# Patient Record
Sex: FEMALE | Race: WHITE | HISPANIC OR LATINO
[De-identification: names, ages, dates, MRNs, and addresses within clinical notes are randomized per-mention and may not be internally consistent; named-entity substitution may affect disease eponyms.]

---

## 2018-05-03 ENCOUNTER — TRANSCRIPTION ENCOUNTER (OUTPATIENT)
Age: 23
End: 2018-05-03

## 2019-01-06 ENCOUNTER — INPATIENT (INPATIENT)
Facility: HOSPITAL | Age: 24
LOS: 3 days | Discharge: HOME | DRG: 153 | End: 2019-01-10
Attending: INTERNAL MEDICINE | Admitting: INTERNAL MEDICINE
Payer: COMMERCIAL

## 2019-01-06 VITALS
DIASTOLIC BLOOD PRESSURE: 61 MMHG | TEMPERATURE: 99 F | HEART RATE: 110 BPM | HEIGHT: 66 IN | OXYGEN SATURATION: 100 % | WEIGHT: 160.06 LBS | RESPIRATION RATE: 17 BRPM | SYSTOLIC BLOOD PRESSURE: 113 MMHG

## 2019-01-06 DIAGNOSIS — M79.10 MYALGIA, UNSPECIFIED SITE: ICD-10-CM

## 2019-01-06 DIAGNOSIS — J02.0 STREPTOCOCCAL PHARYNGITIS: ICD-10-CM

## 2019-01-06 DIAGNOSIS — Z29.9 ENCOUNTER FOR PROPHYLACTIC MEASURES, UNSPECIFIED: ICD-10-CM

## 2019-01-06 DIAGNOSIS — R74.0 NONSPECIFIC ELEVATION OF LEVELS OF TRANSAMINASE AND LACTIC ACID DEHYDROGENASE [LDH]: ICD-10-CM

## 2019-01-06 DIAGNOSIS — D72.825 BANDEMIA: ICD-10-CM

## 2019-01-06 DIAGNOSIS — D72.829 ELEVATED WHITE BLOOD CELL COUNT, UNSPECIFIED: ICD-10-CM

## 2019-01-06 LAB
ALBUMIN SERPL ELPH-MCNC: 3.7 G/DL — SIGNIFICANT CHANGE UP (ref 3.3–5)
ALP SERPL-CCNC: 93 U/L — SIGNIFICANT CHANGE UP (ref 40–120)
ALT FLD-CCNC: 166 U/L — HIGH (ref 10–45)
ANION GAP SERPL CALC-SCNC: 11 MMOL/L — SIGNIFICANT CHANGE UP (ref 5–17)
AST SERPL-CCNC: 151 U/L — HIGH (ref 10–40)
BASOPHILS # BLD AUTO: 0 K/UL — SIGNIFICANT CHANGE UP (ref 0–0.2)
BILIRUB SERPL-MCNC: 0.4 MG/DL — SIGNIFICANT CHANGE UP (ref 0.2–1.2)
BUN SERPL-MCNC: 5 MG/DL — LOW (ref 7–23)
CALCIUM SERPL-MCNC: 8.9 MG/DL — SIGNIFICANT CHANGE UP (ref 8.4–10.5)
CHLORIDE SERPL-SCNC: 104 MMOL/L — SIGNIFICANT CHANGE UP (ref 96–108)
CO2 SERPL-SCNC: 21 MMOL/L — LOW (ref 22–31)
CREAT SERPL-MCNC: 0.79 MG/DL — SIGNIFICANT CHANGE UP (ref 0.5–1.3)
EOSINOPHIL # BLD AUTO: 0.1 K/UL — SIGNIFICANT CHANGE UP (ref 0–0.5)
GLUCOSE SERPL-MCNC: 127 MG/DL — HIGH (ref 70–99)
HCT VFR BLD CALC: 36.4 % — SIGNIFICANT CHANGE UP (ref 34.5–45)
HGB BLD-MCNC: 12.7 G/DL — SIGNIFICANT CHANGE UP (ref 11.5–15.5)
LYMPHOCYTES # BLD AUTO: 1.1 K/UL — SIGNIFICANT CHANGE UP (ref 1–3.3)
LYMPHOCYTES # BLD AUTO: 4 % — LOW (ref 13–44)
MCHC RBC-ENTMCNC: 32.8 PG — SIGNIFICANT CHANGE UP (ref 27–34)
MCHC RBC-ENTMCNC: 34.9 GM/DL — SIGNIFICANT CHANGE UP (ref 32–36)
MCV RBC AUTO: 93.9 FL — SIGNIFICANT CHANGE UP (ref 80–100)
MONOCYTES # BLD AUTO: 1.8 K/UL — HIGH (ref 0–0.9)
MONOCYTES NFR BLD AUTO: 5 % — SIGNIFICANT CHANGE UP (ref 2–14)
NEUTROPHILS # BLD AUTO: 26.9 K/UL — HIGH (ref 1.8–7.4)
NEUTROPHILS NFR BLD AUTO: 79 % — HIGH (ref 43–77)
NEUTS BAND # BLD: 11 % — HIGH (ref 0–8)
PLAT MORPH BLD: NORMAL — SIGNIFICANT CHANGE UP
PLATELET # BLD AUTO: 194 K/UL — SIGNIFICANT CHANGE UP (ref 150–400)
POTASSIUM SERPL-MCNC: 3.8 MMOL/L — SIGNIFICANT CHANGE UP (ref 3.5–5.3)
POTASSIUM SERPL-SCNC: 3.8 MMOL/L — SIGNIFICANT CHANGE UP (ref 3.5–5.3)
PROT SERPL-MCNC: 6.8 G/DL — SIGNIFICANT CHANGE UP (ref 6–8.3)
RAPID RVP RESULT: SIGNIFICANT CHANGE UP
RBC # BLD: 3.88 M/UL — SIGNIFICANT CHANGE UP (ref 3.8–5.2)
RBC # FLD: 11.2 % — SIGNIFICANT CHANGE UP (ref 10.3–14.5)
RBC BLD AUTO: NORMAL — SIGNIFICANT CHANGE UP
S PYO AG SPEC QL IA: POSITIVE
SODIUM SERPL-SCNC: 136 MMOL/L — SIGNIFICANT CHANGE UP (ref 135–145)
VARIANT LYMPHS # BLD: 1 % — SIGNIFICANT CHANGE UP (ref 0–6)
WBC # BLD: 29.9 K/UL — HIGH (ref 3.8–10.5)
WBC # FLD AUTO: 29.9 K/UL — HIGH (ref 3.8–10.5)

## 2019-01-06 PROCEDURE — 99285 EMERGENCY DEPT VISIT HI MDM: CPT

## 2019-01-06 PROCEDURE — 71046 X-RAY EXAM CHEST 2 VIEWS: CPT | Mod: 26

## 2019-01-06 PROCEDURE — 70491 CT SOFT TISSUE NECK W/DYE: CPT | Mod: 26

## 2019-01-06 RX ORDER — SODIUM CHLORIDE 9 MG/ML
1000 INJECTION INTRAMUSCULAR; INTRAVENOUS; SUBCUTANEOUS ONCE
Qty: 0 | Refills: 0 | Status: COMPLETED | OUTPATIENT
Start: 2019-01-06 | End: 2019-01-06

## 2019-01-06 RX ORDER — DEXAMETHASONE 0.5 MG/5ML
10 ELIXIR ORAL ONCE
Qty: 0 | Refills: 0 | Status: COMPLETED | OUTPATIENT
Start: 2019-01-06 | End: 2019-01-06

## 2019-01-06 RX ORDER — ONDANSETRON 8 MG/1
4 TABLET, FILM COATED ORAL ONCE
Qty: 0 | Refills: 0 | Status: COMPLETED | OUTPATIENT
Start: 2019-01-06 | End: 2019-01-06

## 2019-01-06 RX ORDER — ACETAMINOPHEN 500 MG
1000 TABLET ORAL ONCE
Qty: 0 | Refills: 0 | Status: COMPLETED | OUTPATIENT
Start: 2019-01-06 | End: 2019-01-06

## 2019-01-06 RX ORDER — DEXAMETHASONE 0.5 MG/5ML
10 ELIXIR ORAL ONCE
Qty: 0 | Refills: 0 | Status: DISCONTINUED | OUTPATIENT
Start: 2019-01-06 | End: 2019-01-06

## 2019-01-06 RX ORDER — SODIUM CHLORIDE 9 MG/ML
1000 INJECTION, SOLUTION INTRAVENOUS
Qty: 0 | Refills: 0 | Status: DISCONTINUED | OUTPATIENT
Start: 2019-01-06 | End: 2019-01-07

## 2019-01-06 RX ORDER — ENOXAPARIN SODIUM 100 MG/ML
40 INJECTION SUBCUTANEOUS DAILY
Qty: 0 | Refills: 0 | Status: DISCONTINUED | OUTPATIENT
Start: 2019-01-06 | End: 2019-01-10

## 2019-01-06 RX ORDER — KETOROLAC TROMETHAMINE 30 MG/ML
15 SYRINGE (ML) INJECTION ONCE
Qty: 0 | Refills: 0 | Status: DISCONTINUED | OUTPATIENT
Start: 2019-01-06 | End: 2019-01-06

## 2019-01-06 RX ORDER — PANTOPRAZOLE SODIUM 20 MG/1
40 TABLET, DELAYED RELEASE ORAL
Qty: 0 | Refills: 0 | Status: DISCONTINUED | OUTPATIENT
Start: 2019-01-06 | End: 2019-01-10

## 2019-01-06 RX ORDER — AMPICILLIN SODIUM AND SULBACTAM SODIUM 250; 125 MG/ML; MG/ML
3 INJECTION, POWDER, FOR SUSPENSION INTRAMUSCULAR; INTRAVENOUS EVERY 6 HOURS
Qty: 0 | Refills: 0 | Status: DISCONTINUED | OUTPATIENT
Start: 2019-01-06 | End: 2019-01-10

## 2019-01-06 RX ORDER — SODIUM CHLORIDE 9 MG/ML
1000 INJECTION INTRAMUSCULAR; INTRAVENOUS; SUBCUTANEOUS
Qty: 0 | Refills: 0 | Status: DISCONTINUED | OUTPATIENT
Start: 2019-01-06 | End: 2019-01-10

## 2019-01-06 RX ORDER — AMPICILLIN SODIUM AND SULBACTAM SODIUM 250; 125 MG/ML; MG/ML
1.5 INJECTION, POWDER, FOR SUSPENSION INTRAMUSCULAR; INTRAVENOUS ONCE
Qty: 0 | Refills: 0 | Status: COMPLETED | OUTPATIENT
Start: 2019-01-06 | End: 2019-01-06

## 2019-01-06 RX ADMIN — Medication 1000 MILLIGRAM(S): at 13:10

## 2019-01-06 RX ADMIN — SODIUM CHLORIDE 1000 MILLILITER(S): 9 INJECTION INTRAMUSCULAR; INTRAVENOUS; SUBCUTANEOUS at 12:47

## 2019-01-06 RX ADMIN — SODIUM CHLORIDE 100 MILLILITER(S): 9 INJECTION, SOLUTION INTRAVENOUS at 17:42

## 2019-01-06 RX ADMIN — Medication 15 MILLIGRAM(S): at 12:47

## 2019-01-06 RX ADMIN — Medication 10 MILLIGRAM(S): at 13:50

## 2019-01-06 RX ADMIN — SODIUM CHLORIDE 1000 MILLILITER(S): 9 INJECTION INTRAMUSCULAR; INTRAVENOUS; SUBCUTANEOUS at 14:15

## 2019-01-06 RX ADMIN — Medication 15 MILLIGRAM(S): at 13:15

## 2019-01-06 RX ADMIN — ONDANSETRON 4 MILLIGRAM(S): 8 TABLET, FILM COATED ORAL at 12:47

## 2019-01-06 RX ADMIN — AMPICILLIN SODIUM AND SULBACTAM SODIUM 200 GRAM(S): 250; 125 INJECTION, POWDER, FOR SUSPENSION INTRAMUSCULAR; INTRAVENOUS at 22:18

## 2019-01-06 RX ADMIN — AMPICILLIN SODIUM AND SULBACTAM SODIUM 1.5 GRAM(S): 250; 125 INJECTION, POWDER, FOR SUSPENSION INTRAMUSCULAR; INTRAVENOUS at 16:00

## 2019-01-06 RX ADMIN — Medication 102 MILLIGRAM(S): at 13:11

## 2019-01-06 RX ADMIN — AMPICILLIN SODIUM AND SULBACTAM SODIUM 200 GRAM(S): 250; 125 INJECTION, POWDER, FOR SUSPENSION INTRAMUSCULAR; INTRAVENOUS at 14:27

## 2019-01-06 RX ADMIN — Medication 40 MILLIGRAM(S): at 20:36

## 2019-01-06 RX ADMIN — Medication 400 MILLIGRAM(S): at 12:47

## 2019-01-06 NOTE — ED PROVIDER NOTE - DURATION
Problem: Goal Outcome Summary  Goal: Goal Outcome Summary  PT Unit 3: Pt tolerated PT intervention well including positioning in side lying and prone. Pt able to active cervical musculature in prone to assist with rotation and extension. PT will continue to follow 3x/week.  Zoe Toribio, PT, -[phone]       4/day(s)

## 2019-01-06 NOTE — H&P ADULT - NSHPLABSRESULTS_GEN_ALL_CORE
12.7   29.9  )-----------( 194      ( 06 Jan 2019 12:32 )             36.4               01-06    136  |  104  |  5<L>  ----------------------------<  127<H>  3.8   |  21<L>  |  0.79    Ca    8.9      06 Jan 2019 12:32    TPro  6.8  /  Alb  3.7  /  TBili  0.4  /  DBili  x   /  AST  151<H>  /  ALT  166<H>  /  AlkPhos  93  01-06        < from: CT Neck Soft Tissue w/ IV Cont (01.06.19 @ 15:49) >    Impression: Enlargement of the tonsils and uvula consistent with   tonsillitis. No evidence of abscess. Cervical lymphadenopathy.    < end of copied text >

## 2019-01-06 NOTE — ED ADULT NURSE NOTE - OBJECTIVE STATEMENT
24 y/o Female c/o sore throat x4 days, with headache, body aches, RUQ abdominal pain and back pain.   Pt reports difficulty swallowing due to pain and difficulty swallowing secretions. Decreased PO intake.   Denies photophobia, neck stiffness, chest pain, vomiting,  diarrhea, and cough.  No acute respiratory distress noted.  Extremities mobile.

## 2019-01-06 NOTE — H&P ADULT - PROBLEM SELECTOR PLAN 5
secondary to acute infection  monitor and trends   RUQ US patient states that she drink 4-5 shots of hard alcohol daily   not dependant   no Hx of withdrawal   secondary to acute infection  monitor and trends   RUQ US

## 2019-01-06 NOTE — ED PROVIDER NOTE - NS_ ATTENDINGSCRIBEDETAILS _ED_A_ED_FT
The scribe's documentation has been prepared under my direction and personally reviewed by me in its entirety. I confirm that the note above accurately reflects all work, treatment, procedures, and medical decision making performed by me (Dr. Bain).

## 2019-01-06 NOTE — ED PROVIDER NOTE - MUSCULOSKELETAL NECK EXAM
Able to range neck fully and in all planes. Mildly painful submandibular lymphadenopathy. Soft submandibular space.

## 2019-01-06 NOTE — ED PROVIDER NOTE - THROAT FINDINGS
Bilateral tonsillar hypertrophy and exudates. Retropharyngeal space does not appear bulging. No trismus. No pooling of secretions. Muffled voice./NO DROOLING/TONSILLAR SWELLING

## 2019-01-06 NOTE — H&P ADULT - HISTORY OF PRESENT ILLNESS
Pt is a 22 y/o F with no significant PMHx or PSHx c/o sore throat x4 days, myalgia, headache. +RUQ abdominal pain and back pain.  Difficulty swallowing secondary to pain and difficulty swallowing secretions. Decreased PO intake.  Pt has been taking Advil and OTC nasal spray that has not improved symptoms.  Denies photophobia, neck stiffness, vomiting, and cough

## 2019-01-06 NOTE — H&P ADULT - ATTENDING COMMENTS
I had a prolonged conversation with pt. and family re. likely diagnosis and plan who verbalizes clear understanding,   Spoke to all consultants.  75 minutes spent on total encounter; more than 50% of the visit was spent counseling and/or coordinating care by the attending physician.    Differential diagnosis and plan of care discussed with patient after the evaluation.   Advanced care planning options discussed.   Pain assessed and judicious use of narcotics when appropriate was discussed.  Importance of Fall prevention discussed.  Counseling on Smoking and Alcohol cessation was offered when appropriate.  Counseling on Diet, exercise, and medication compliance was done.

## 2019-01-06 NOTE — ED PROVIDER NOTE - OBJECTIVE STATEMENT
24 y/o F with no significant PMHx or PSHx c/o sore throat x4 days, myalgia, headache. +RUQ abdominal pain and back pain.  Difficulty swallowing secondary to pain and difficulty swallowing secretions. Decreased PO intake.  Pt has been taking Advil and OTC nasal spray that has not improved symptoms.  Denies photophobia, neck stiffness, vomiting, and cough.

## 2019-01-06 NOTE — H&P ADULT - ASSESSMENT
patient is a 22 y/o female presenting with severe sore throat and myalgia found to have leukocytosis of 30K

## 2019-01-06 NOTE — ED PROVIDER NOTE - PROGRESS NOTE DETAILS
Manish NAVA: pt with leukocytosis with bandemia and elevated LFTs. Will obtain CT to eval for RPA. Pt continues to look well, reports she feels better with meds. Likely CT and CDU for rpt BW. Pt with leukocytosis with bandemia and elevated LFTs. Will obtain CT to eval for RPA. Pt continues to look well, reports she feels better with meds. Likely CT and CDU for rpt BW and airway monitoring. Manish NAVA: CDU not taking additional pts at this time. case d/w pt's PMD Dr Caba, would like admission to [facility]. [facility]

## 2019-01-06 NOTE — H&P ADULT - NSHPPHYSICALEXAM_GEN_ALL_CORE
Vital Signs Last 24 Hrs  T(C): 37.2 (06 Jan 2019 11:39), Max: 37.2 (06 Jan 2019 11:39)  T(F): 99 (06 Jan 2019 11:39), Max: 99 (06 Jan 2019 11:39)  HR: 100 (06 Jan 2019 12:20) (100 - 110)  BP: 107/59 (06 Jan 2019 12:20) (107/59 - 113/61)  BP(mean): --  RR: 16 (06 Jan 2019 12:20) (16 - 17)  SpO2: 99% (06 Jan 2019 12:20) (99% - 100%)    Appearance: Normal	  HEENT:   enlarged tonsils   Lymphatic: No lymphadenopathy , no edema  Cardiovascular: Normal S1 S2, No JVD, No murmurs , Peripheral pulses palpable 2+ bilaterally  Respiratory: Lungs clear to auscultation, normal effort 	  Gastrointestinal:  Soft, Non-tender, + BS	  Skin: No rashes, No ecchymoses, No cyanosis, warm to touch  Musculoskeletal: Normal range of motion, normal strength  Psychiatry:  Mood & affect appropriate  Ext: No edema

## 2019-01-06 NOTE — ED PROVIDER NOTE - MEDICAL DECISION MAKING DETAILS
Otherwise healthy 24 y/o W with significant tonsillar hypertrophy and pharyngitis. No evidence of airway concern. Associated with malaise and myalgia. Overall non-toxic and well appearing. Tolerating secretions. No indication for imaging at this time. Will treat with fluids and Decadron and anti-inflammatories. Otherwise healthy 24 y/o W with significant tonsillar hypertrophy and pharyngitis. No evidence of airway concern. Associated with malaise and myalgia. Overall non-toxic and well appearing. Tolerating secretions. No indication for imaging at this time. Will treat with fluids and Decadron and anti-inflammatories. Will likely DC home. Otherwise healthy 24 y/o W with significant tonsillar hypertrophy and pharyngitis. No evidence of airway concern. Associated with malaise and myalgia. Overall non-toxic and well appearing. Tolerating secretions. No indication for imaging at this time. Will treat with fluids and Decadron and anti-inflammatories. Will likely DC home barring lab abnormalities.  --BELINDA

## 2019-01-07 DIAGNOSIS — R07.89 OTHER CHEST PAIN: ICD-10-CM

## 2019-01-07 LAB
ALBUMIN SERPL ELPH-MCNC: 3.5 G/DL — SIGNIFICANT CHANGE UP (ref 3.3–5)
ALP SERPL-CCNC: 113 U/L — SIGNIFICANT CHANGE UP (ref 40–120)
ALT FLD-CCNC: 287 U/L — HIGH (ref 10–45)
ANION GAP SERPL CALC-SCNC: 13 MMOL/L — SIGNIFICANT CHANGE UP (ref 5–17)
AST SERPL-CCNC: 239 U/L — HIGH (ref 10–40)
BASOPHILS # BLD AUTO: 0 K/UL — SIGNIFICANT CHANGE UP (ref 0–0.2)
BASOPHILS NFR BLD AUTO: 0 % — SIGNIFICANT CHANGE UP (ref 0–2)
BILIRUB SERPL-MCNC: 0.3 MG/DL — SIGNIFICANT CHANGE UP (ref 0.2–1.2)
BUN SERPL-MCNC: 9 MG/DL — SIGNIFICANT CHANGE UP (ref 7–23)
C TRACH RRNA SPEC QL NAA+PROBE: SIGNIFICANT CHANGE UP
CALCIUM SERPL-MCNC: 9.2 MG/DL — SIGNIFICANT CHANGE UP (ref 8.4–10.5)
CHLORIDE SERPL-SCNC: 107 MMOL/L — SIGNIFICANT CHANGE UP (ref 96–108)
CO2 SERPL-SCNC: 20 MMOL/L — LOW (ref 22–31)
CREAT SERPL-MCNC: 0.69 MG/DL — SIGNIFICANT CHANGE UP (ref 0.5–1.3)
EBV EA AB SER IA-ACNC: 11.5 U/ML — HIGH
EBV EA AB TITR SER IF: POSITIVE
EBV EA IGG SER-ACNC: POSITIVE
EBV NA IGG SER IA-ACNC: >600 U/ML — HIGH
EBV PATRN SPEC IB-IMP: SIGNIFICANT CHANGE UP
EBV VCA IGG AVIDITY SER QL IA: POSITIVE
EBV VCA IGM SER IA-ACNC: 24.8 U/ML — SIGNIFICANT CHANGE UP
EBV VCA IGM SER IA-ACNC: 367 U/ML — HIGH
EBV VCA IGM TITR FLD: NEGATIVE — SIGNIFICANT CHANGE UP
EOSINOPHIL # BLD AUTO: 0 K/UL — SIGNIFICANT CHANGE UP (ref 0–0.5)
EOSINOPHIL NFR BLD AUTO: 0 % — SIGNIFICANT CHANGE UP (ref 0–6)
GLUCOSE SERPL-MCNC: 162 MG/DL — HIGH (ref 70–99)
HBA1C BLD-MCNC: 5 % — SIGNIFICANT CHANGE UP (ref 4–5.6)
HCG UR QL: NEGATIVE — SIGNIFICANT CHANGE UP
HCT VFR BLD CALC: 36.3 % — SIGNIFICANT CHANGE UP (ref 34.5–45)
HGB BLD-MCNC: 12.3 G/DL — SIGNIFICANT CHANGE UP (ref 11.5–15.5)
LYMPHOCYTES # BLD AUTO: 1.76 K/UL — SIGNIFICANT CHANGE UP (ref 1–3.3)
LYMPHOCYTES # BLD AUTO: 5 % — LOW (ref 13–44)
MCHC RBC-ENTMCNC: 31.8 PG — SIGNIFICANT CHANGE UP (ref 27–34)
MCHC RBC-ENTMCNC: 33.9 GM/DL — SIGNIFICANT CHANGE UP (ref 32–36)
MCV RBC AUTO: 93.8 FL — SIGNIFICANT CHANGE UP (ref 80–100)
MONOCYTES # BLD AUTO: 2.11 K/UL — HIGH (ref 0–0.9)
MONOCYTES NFR BLD AUTO: 6 % — SIGNIFICANT CHANGE UP (ref 2–14)
N GONORRHOEA RRNA SPEC QL NAA+PROBE: SIGNIFICANT CHANGE UP
NEUTROPHILS # BLD AUTO: 31.28 K/UL — HIGH (ref 1.8–7.4)
NEUTROPHILS NFR BLD AUTO: 86 % — HIGH (ref 43–77)
PLATELET # BLD AUTO: 255 K/UL — SIGNIFICANT CHANGE UP (ref 150–400)
POTASSIUM SERPL-MCNC: 4 MMOL/L — SIGNIFICANT CHANGE UP (ref 3.5–5.3)
POTASSIUM SERPL-SCNC: 4 MMOL/L — SIGNIFICANT CHANGE UP (ref 3.5–5.3)
PROT SERPL-MCNC: 6.8 G/DL — SIGNIFICANT CHANGE UP (ref 6–8.3)
RBC # BLD: 3.87 M/UL — SIGNIFICANT CHANGE UP (ref 3.8–5.2)
RBC # FLD: 12.7 % — SIGNIFICANT CHANGE UP (ref 10.3–14.5)
SODIUM SERPL-SCNC: 140 MMOL/L — SIGNIFICANT CHANGE UP (ref 135–145)
SPECIMEN SOURCE: SIGNIFICANT CHANGE UP
WBC # BLD: 35.15 K/UL — HIGH (ref 3.8–10.5)
WBC # FLD AUTO: 35.15 K/UL — HIGH (ref 3.8–10.5)

## 2019-01-07 RX ADMIN — PANTOPRAZOLE SODIUM 40 MILLIGRAM(S): 20 TABLET, DELAYED RELEASE ORAL at 06:44

## 2019-01-07 RX ADMIN — Medication 40 MILLIGRAM(S): at 08:23

## 2019-01-07 RX ADMIN — Medication 40 MILLIGRAM(S): at 21:39

## 2019-01-07 RX ADMIN — AMPICILLIN SODIUM AND SULBACTAM SODIUM 200 GRAM(S): 250; 125 INJECTION, POWDER, FOR SUSPENSION INTRAMUSCULAR; INTRAVENOUS at 16:55

## 2019-01-07 RX ADMIN — SODIUM CHLORIDE 100 MILLILITER(S): 9 INJECTION INTRAMUSCULAR; INTRAVENOUS; SUBCUTANEOUS at 03:40

## 2019-01-07 RX ADMIN — AMPICILLIN SODIUM AND SULBACTAM SODIUM 200 GRAM(S): 250; 125 INJECTION, POWDER, FOR SUSPENSION INTRAMUSCULAR; INTRAVENOUS at 21:39

## 2019-01-07 RX ADMIN — SODIUM CHLORIDE 100 MILLILITER(S): 9 INJECTION INTRAMUSCULAR; INTRAVENOUS; SUBCUTANEOUS at 16:55

## 2019-01-07 RX ADMIN — AMPICILLIN SODIUM AND SULBACTAM SODIUM 200 GRAM(S): 250; 125 INJECTION, POWDER, FOR SUSPENSION INTRAMUSCULAR; INTRAVENOUS at 03:39

## 2019-01-07 RX ADMIN — AMPICILLIN SODIUM AND SULBACTAM SODIUM 200 GRAM(S): 250; 125 INJECTION, POWDER, FOR SUSPENSION INTRAMUSCULAR; INTRAVENOUS at 10:26

## 2019-01-07 NOTE — CONSULT NOTE ADULT - SUBJECTIVE AND OBJECTIVE BOX
CHIEF COMPLAINT:Patient is a 23y old  Female who presents with a chief complaint of tonsilitis, leukocytosis (07 Jan 2019 16:55)      HISTORY OF PRESENT ILLNESS:HPI:  Pt is a 22 y/o F with no significant PMHx or PSHx c/o sore throat x4 days, myalgia, headache. +RUQ abdominal pain and back pain.  Difficulty swallowing secondary to pain and difficulty swallowing secretions. Decreased PO intake.  Pt has been taking Advil and OTC nasal spray that has not improved symptoms.  Denies photophobia, neck stiffness, vomiting, and cough (06 Jan 2019 17:47)      PAST MEDICAL & SURGICAL HISTORY:  No pertinent past medical history  No significant past surgical history          MEDICATIONS:  enoxaparin Injectable 40 milliGRAM(s) SubCutaneous daily    ampicillin/sulbactam  IVPB 3 Gram(s) IV Intermittent every 6 hours        pantoprazole    Tablet 40 milliGRAM(s) Oral before breakfast    methylPREDNISolone sodium succinate Injectable 40 milliGRAM(s) IV Push every 12 hours    sodium chloride 0.9%. 1000 milliLiter(s) IV Continuous <Continuous>      FAMILY HISTORY:      Non-contributory    SOCIAL HISTORY:    not a smoker    Allergies    No Known Allergies    Intolerances     Review of Systems:  Review of Systems: REVIEW OF SYSTEMS:   CONSTITUTIONAL: + weakness, + fevers or chills  EYES/ENT: + sore throat, + weakness, ead ache   NECK: No pain or stiffness  RESPIRATORY: No cough, wheezing, hemoptysis; No shortness of breath  CARDIOVASCULAR: No chest pain or palpitations  GASTROINTESTINAL: No abdominal or epigastric pain. No nausea, vomiting, or hematemesis; No diarrhea or constipation. No melena or hematochezia.  GENITOURINARY: No dysuria, frequency or hematuria  NEUROLOGICAL: No numbness or weakness  SKIN: No itching, burning, rashes, or lesions  All other review of systems is negative unless indicated above.	  	    All other ROS negative    PHYSICAL EXAM:  T(C): 36.6 (01-07-19 @ 19:56), Max: 36.8 (01-07-19 @ 04:50)  HR: 54 (01-07-19 @ 19:56) (54 - 90)  BP: 96/53 (01-07-19 @ 19:56) (93/50 - 102/69)  RR: 18 (01-07-19 @ 19:56) (18 - 18)  SpO2: 95% (01-07-19 @ 19:56) (94% - 99%)  Wt(kg): --  I&O's Summary      Appearance: Normal	  HEENT:   Normal oral mucosa, EOMI	  Cardiovascular: Normal S1 S2, No JVD, No murmurs  Respiratory: Lungs clear to auscultation	  Psychiatry: Alert, Mood & affect appropriate  Gastrointestinal:  Soft, Non-tender, + BS	  Skin: No rashes, No ecchymoses, No cyanosis	  Neurologic: Non-focal  Extremities:  No clubbing, cyanosis or edema  Vascular: Peripheral pulses palpable  bilaterally  	    	  	  CARDIAC MARKERS:                                  12.3   35.15 )-----------( 255      ( 07 Jan 2019 08:20 )             36.3     01-07    140  |  107  |  9   ----------------------------<  162<H>  4.0   |  20<L>  |  0.69    Ca    9.2      07 Jan 2019 06:16    TPro  6.8  /  Alb  3.5  /  TBili  0.3  /  DBili  x   /  AST  239<H>  /  ALT  287<H>  /  AlkPhos  113  01-07        Radiology: cxr clear lungs      Assessment and Plan:   · Assessment		  patient is a 22 y/o female presenting with severe sore throat and myalgia found to have leukocytosis of 30K     Problem/Plan - 1:  ·  Problem: Pharyngitis due to Streptococcus species.  Plan: ID eval noted   ABx per ID  IV hydration.     Problem/Plan - 2:  ·  Problem: Leukocytosis.  Plan: monitor and trend  worsening this morning most likley secondary to steroid use   Hematology eval appreciated.     Problem/Plan - 3:  ·  Problem: Chest wall pain.  Plan: tender on palpation , does not appear cardiac in nature  EKG  Echo as outpt     Problem/Plan - 4:  ·  Problem: Myalgia.  Plan: 2/2 infectious process    Problem/Plan - 5:  ·  Problem: Bandemia.  Plan: monitor.     Problem/Plan - 6:  ·  Problem: Prophylactic measure.  Plan: DVT and Gi PPX.         Nilesh Juarez DO [facility]  [phone]

## 2019-01-07 NOTE — CONSULT NOTE ADULT - SUBJECTIVE AND OBJECTIVE BOX
Patient is a 23y old  Female who presents with a chief complaint of sore throat, body aches (07 Jan 2019 14:54)    HPI:  Pt is a 22 y/o female with no significant PMHx, PSHx, or daily medications who developed progressive sore throat, myalgias, RUQ abdominal pain, and rigors since Friday. She used nasal spray and advil to no effect. No tylenol. Febrile to 102.6 at home. She came to the ED on Sunday am when her sore throat precluded her from speaking or swallowing. Ct neck revealed tonsilitis but no abscess. Found to have marked leukocytosis and transaminitis that has worsened today. EBV serology consistent with past infection. + strep A.   She denies anorexia, nausea, emesis, diarrhea, constipation, blood in the stool. Much improved today symptomatically on unasyn and steroids.     PAST MEDICAL & SURGICAL HISTORY:  No pertinent past medical history  No significant past surgical history    Allergies  No Known Allergies    MEDICATIONS  (STANDING):  ampicillin/sulbactam  IVPB 3 Gram(s) IV Intermittent every 6 hours  enoxaparin Injectable 40 milliGRAM(s) SubCutaneous daily  methylPREDNISolone sodium succinate Injectable 40 milliGRAM(s) IV Push every 12 hours  pantoprazole    Tablet 40 milliGRAM(s) Oral before breakfast  sodium chloride 0.9%. 1000 milliLiter(s) (100 mL/Hr) IV Continuous <Continuous>    Social History: lives alone  + vaping, + alcohol abuse    Family History: noncontribultory    Review of Systems:  General: see  HPI  Cardiac: Denies chest pain, shortness of breath, dyspnea on exertion, syncope, lower extremity edema  Respiratory: Denies cough, dyspnea, wheezing  Gastrointestinal: See HPI. Denies dysphagia, odynophagia, rectal bleeding, melena  Genitourinary: Denies dysuria, hematuria, frequency  Dermatologic: Denies jaundice, pruritis, rashes  Hematologic: Denies abnormal bleeding, bruising, petechaie   Musculoskeletal: see HPI  Rheumatologic: Denies joint pain, swelling, stiffness  Neurologic: Denies headache, paresthesias, changes in vision or speech    Vital Signs Last 24 Hrs  T(C): 36.8 (07 Jan 2019 08:25), Max: 37.1 (06 Jan 2019 16:11)  T(F): 98.2 (07 Jan 2019 08:25), Max: 98.7 (06 Jan 2019 16:11)  HR: 90 (07 Jan 2019 08:25) (57 - 93)  BP: 102/69 (07 Jan 2019 08:25) (99/56 - 103/70)  BP(mean): --  RR: 18 (07 Jan 2019 08:25) (16 - 18)  SpO2: 99% (07 Jan 2019 08:25) (96% - 100%)    PHYSICAL EXAM:  Constitutional: Alert and oriented x 3, in no acute distress  HEENT: NCAT, no scleral icterus,   NECK: tonsilar enlargement  Cardiovascular: RRR, S1 and S2, no audible murmurs  Respiratory: Clear to ascultation bilateraly, no wheezes, rales, rhonchi  Gastrointestinal: soft, NTND, normactive bowel sounds, no palpable HSM   Extremities: No peripheral edema, clubbing, or cyanosis  Psychiatric: Normal mood, normal affect  Skin: No rashes, jaundice, ecchymosis    LABS:                        12.3   35.15 )-----------( 255      ( 07 Jan 2019 08:20 )             36.3     01-07    140  |  107  |  9   ----------------------------<  162<H>  4.0   |  20<L>  |  0.69    Ca    9.2      07 Jan 2019 06:16    TPro  6.8  /  Alb  3.5  /  TBili  0.3  /  DBili  x   /  AST  239<H>  /  ALT  287<H>  /  AlkPhos  113  01-07      LIVER FUNCTIONS - ( 07 Jan 2019 06:16 )  Alb: 3.5 g/dL / Pro: 6.8 g/dL / ALK PHOS: 113 U/L / ALT: 287 U/L / AST: 239 U/L / GGT: x           RADIOLOGY & ADDITIONAL TESTS:  < from: CT Neck Soft Tissue w/ IV Cont (01.06.19 @ 15:49) >  mpression: Enlargement of the tonsils and uvula consistent with   tonsillitis. No evidence of abscess. Cervical lymphadenopathy.    < end of copied text >

## 2019-01-07 NOTE — PROGRESS NOTE ADULT - ASSESSMENT
patient is a 24 y/o female presenting with severe sore throat and myalgia found to have leukocytosis of 30K

## 2019-01-07 NOTE — CONSULT NOTE ADULT - ASSESSMENT
22 yo female with an acute illness characterized by rigors, myalgias, and a pharyngitis with swallowing difficulties.  This could be an acute group A strep tonsillitis although her elevated LFT's are higher than I would expect as is her wbc count.  Acute EBV is in the differential but serology seems to point towards old infection.  Adult onset JRA can produce a similar clinical picture, often associated with high fever and thrombocytosis.  Suggest:  1.Unasyn with eventual conversion to augmentin  2.Follow LFT's  3.Favor ENT evaluation  4.Serial CBC,Diff and CMP  5.Hep A,B,C serologies  6.CMV IgG,IgM  7.Steroid taper, would favor in hospital evaluation for another 24 hours as she is at risk for worsening airway compromise when steroids are tapered. 24 yo female with an acute illness characterized by rigors, myalgias, and a pharyngitis with swallowing difficulties.  This could be an acute group A strep tonsillitis although her elevated LFT's are higher than I would expect as is her wbc count.  Acute EBV is in the differential but serology seems to point towards old infection.  Adult onset JRA can produce a similar clinical picture, often associated with high fever and thrombocytosis.  Suggest:  1.Unasyn with eventual conversion to augmentin  2.Follow LFT's  3.Favor ENT evaluation  4.Serial CBC,Diff and CMP  5.Hep A,B,C serologies  6.CMV IgG,IgM, Ferritin level  7.Steroid taper, would favor in hospital evaluation for another 24 hours as she is at risk for worsening airway compromise when steroids are tapered.

## 2019-01-07 NOTE — CONSULT NOTE ADULT - SUBJECTIVE AND OBJECTIVE BOX
HPI:   Patient is a 23y female with no significant PMH who came to ER 1/6 with 24 hours of rigors and throat pain with difficulty swallowing.She was given IV steroids and Unasyn and is doing better.No exposure to children or ill contacts.Her LFT's and WBC are elevated,her EBV seerology is c/w old infection and GAS screen is positive.She is doing better today, her CT scan showed tonsillar enlargement and cervical adenopathy.No recent infections, no joint pains or rashes.Her last sexual contact was 1 months ago.    REVIEW OF SYSTEMS:  All other review of systems negative (Comprehensive ROS)    PAST MEDICAL & SURGICAL HISTORY:  No pertinent past medical history  No significant past surgical history      Allergies    No Known Allergies    Intolerances        Antimicrobials Day #  :day 2  ampicillin/sulbactam  IVPB 3 Gram(s) IV Intermittent every 6 hours    Other Medications:  enoxaparin Injectable 40 milliGRAM(s) SubCutaneous daily  methylPREDNISolone sodium succinate Injectable 40 milliGRAM(s) IV Push every 12 hours  pantoprazole    Tablet 40 milliGRAM(s) Oral before breakfast  sodium chloride 0.9%. 1000 milliLiter(s) IV Continuous <Continuous>      FAMILY HISTORY:      SOCIAL HISTORY:  Smoking:  no   ETOH: no   Drug Use:no     Single     T(F): 98.2 (01-07-19 @ 08:25), Max: 98.7 (01-06-19 @ 16:11)  HR: 90 (01-07-19 @ 08:25)  BP: 102/69 (01-07-19 @ 08:25)  RR: 18 (01-07-19 @ 08:25)  SpO2: 99% (01-07-19 @ 08:25)  Wt(kg): --    PHYSICAL EXAM:  General: alert, no acute distress  Eyes:  anicteric, no conjunctival injection, no discharge  Oropharynx: enlarged, "kissing"tonsills, white exudate 	  Neck: supple, without adenopathy  Lungs: clear to auscultation  Heart: regular rate and rhythm; no murmur, rubs or gallops  Abdomen: soft, nondistended, nontender, without mass or organomegaly  Skin: no lesions  Extremities: no clubbing, cyanosis, or edema  Neurologic: alert, oriented, moves all extremities    LAB RESULTS:                        12.3   35.15 )-----------( 255      ( 07 Jan 2019 08:20 )             36.3     01-07    140  |  107  |  9   ----------------------------<  162<H>  4.0   |  20<L>  |  0.69    Ca    9.2      07 Jan 2019 06:16    TPro  6.8  /  Alb  3.5  /  TBili  0.3  /  DBili  x   /  AST  239<H>  /  ALT  287<H>  /  AlkPhos  113  01-07    LIVER FUNCTIONS - ( 07 Jan 2019 06:16 )  Alb: 3.5 g/dL / Pro: 6.8 g/dL / ALK PHOS: 113 U/L / ALT: 287 U/L / AST: 239 U/L / GGT: x               MICROBIOLOGY:  RECENT CULTURES:    RVP negative  GAS screen positive    RADIOLOGY REVIEWED:  < from: CT Neck Soft Tissue w/ IV Cont (01.06.19 @ 15:49) >  Impression: Enlargement of the tonsils and uvula consistent with   tonsillitis. No evidence of abscess. Cervical lymphadenopathy.    < end of copied text >  < from: Xray Chest 2 Views PA/Lat (01.06.19 @ 18:53) >  IMPRESSION: Clear lungs.    < end of copied text >

## 2019-01-07 NOTE — CONSULT NOTE ADULT - SUBJECTIVE AND OBJECTIVE BOX
Chief Complaint:  Patient is a 23y old  Female who presents with a chief complaint of tonsilitis, leukocytosis (06 Jan 2019 17:47)      HPI:  patient was okay up until about two weeks ago.  She says that she had a URI.  It initially got better but then she developed body aches, fevers, and swollen tonsils that prevented her from swallowing.  She came to the ED.  She has a significant leucocytosis.  She did have a CT of the neck and she has tonsillitis and cervical adenopathy.  She is being treated with Unasyn and is feeling a little better today.  her only sick contact is a friend who had bronchitis recently    Medications:  ampicillin/sulbactam  IVPB 3 Gram(s) IV Intermittent every 6 hours  enoxaparin Injectable 40 milliGRAM(s) SubCutaneous daily  methylPREDNISolone sodium succinate Injectable 40 milliGRAM(s) IV Push every 12 hours  pantoprazole    Tablet 40 milliGRAM(s) Oral before breakfast  sodium chloride 0.9%. 1000 milliLiter(s) IV Continuous <Continuous>    Allergies:  No Known Allergies    FAMILY HISTORY:  Non contributory    PAST MEDICAL & SURGICAL HISTORY:  No pertinent past medical history  No significant past surgical history    REVIEW OF SYSTEMS      General: fevers resolved.  No chills or sweats.  Some fatigue.  No weight loss.  Appetite is okay    Skin/Breast: No rash, itching or bruising  	  Ophthalmologic: No vision changes  	  ENMT:	No hearing loss, nosebleeds.  She has a sore throat and ear aches which are slowly improving    Respiratory and Thorax: has a dry cough.  No SOB, wheezing or hemoptysis  	  Cardiovascular:	No CP or palpitations    Gastrointestinal:	No heartburn, N/V/D, BRBPR.  She has mild RUQ pain    Genitourinary:	No dysuria, hematuria, frequency    Musculoskeletal:	 No back pain, leg pain, swelling    Neurological: Mild HA.  No dizziness, numbness, tingling    Psychiatric: Mild insomnia and anxiety.  No depression    Vitals:  Vital Signs Last 24 Hrs  T(C): 36.8 (07 Jan 2019 08:25), Max: 37.1 (06 Jan 2019 16:11)  T(F): 98.2 (07 Jan 2019 08:25), Max: 98.7 (06 Jan 2019 16:11)  HR: 90 (07 Jan 2019 08:25) (57 - 93)  BP: 102/69 (07 Jan 2019 08:25) (99/56 - 103/70)  BP(mean): --  RR: 18 (07 Jan 2019 08:25) (16 - 18)  SpO2: 99% (07 Jan 2019 08:25) (96% - 100%)    Pex:  alert NAD  EOMI anicteric sclera  Neck Supple small cervical LNA  Cv s1 S2 RRR  Lungs clear B/L  abd soft ND +BS, mild RUQ tebnderness  No LE edema or tenderness  AoX3 good motor strength    Labs:                        12.3   35.15 )-----------( 255      ( 07 Jan 2019 08:20 )             36.3     CBC Full  -  ( 07 Jan 2019 08:20 )  WBC Count : 35.15 K/uL  Hemoglobin : 12.3 g/dL  Hematocrit : 36.3 %  Platelet Count - Automated : 255 K/uL  Mean Cell Volume : 93.8 fl  Mean Cell Hemoglobin : 31.8 pg  Mean Cell Hemoglobin Concentration : 33.9 gm/dL  Auto Neutrophil # : 31.28 K/uL  Auto Lymphocyte # : 1.76 K/uL  Auto Monocyte # : 2.11 K/uL  Auto Eosinophil # : 0.00 K/uL  Auto Basophil # : 0.00 K/uL  Auto Neutrophil % : 86.0 %  Auto Lymphocyte % : 5.0 %  Auto Monocyte % : 6.0 %  Auto Eosinophil % : 0.0 %  Auto Basophil % : 0.0 %    01-07    140  |  107  |  9   ----------------------------<  162<H>  4.0   |  20<L>  |  0.69    Ca    9.2      07 Jan 2019 06:16    TPro  6.8  /  Alb  3.5  /  TBili  0.3  /  DBili  x   /  AST  239<H>  /  ALT  287<H>  /  AlkPhos  113  01-07      [id number]

## 2019-01-07 NOTE — PROGRESS NOTE ADULT - SUBJECTIVE AND OBJECTIVE BOX
Subjective: Patient seen and examined. No new events except as noted.   cont to have sore throat with pain with swallowing     REVIEW OF SYSTEMS:    CONSTITUTIONAL: No weakness, fevers or chills  EYES/ENT: throat pain   NECK: No pain or stiffness  RESPIRATORY: No cough, wheezing, hemoptysis; No shortness of breath  CARDIOVASCULAR: No chest pain or palpitations  GASTROINTESTINAL: No abdominal or epigastric pain. No nausea, vomiting, or hematemesis; No diarrhea or constipation. No melena or hematochezia.  GENITOURINARY: No dysuria, frequency or hematuria  NEUROLOGICAL: No numbness or weakness  SKIN: No itching, burning, rashes, or lesions   All other review of systems is negative unless indicated above.    MEDICATIONS:  MEDICATIONS  (STANDING):  ampicillin/sulbactam  IVPB 3 Gram(s) IV Intermittent every 6 hours  enoxaparin Injectable 40 milliGRAM(s) SubCutaneous daily  methylPREDNISolone sodium succinate Injectable 40 milliGRAM(s) IV Push every 12 hours  pantoprazole    Tablet 40 milliGRAM(s) Oral before breakfast  sodium chloride 0.9%. 1000 milliLiter(s) (100 mL/Hr) IV Continuous <Continuous>      PHYSICAL EXAM:  T(C): 36.8 (01-07-19 @ 16:07), Max: 37 (01-06-19 @ 21:07)  HR: 86 (01-07-19 @ 16:07) (57 - 90)  BP: 93/50 (01-07-19 @ 16:07) (93/50 - 103/70)  RR: 18 (01-07-19 @ 16:07) (16 - 18)  SpO2: 94% (01-07-19 @ 16:07) (94% - 100%)  Wt(kg): --  I&O's Summary        Appearance: Normal	  HEENT:   Normal oral mucosa, white exudate tonsilitis   Lymphatic: No lymphadenopathy , no edema  Cardiovascular: Normal S1 S2, No JVD, No murmurs , Peripheral pulses palpable 2+ bilaterally  Respiratory: Lungs clear to auscultation, normal effort 	  Gastrointestinal:  Soft, Non-tender, + BS	  Skin: No rashes, No ecchymoses, No cyanosis, warm to touch  Musculoskeletal: Normal range of motion, normal strength  Psychiatry:  Mood & affect appropriate  Ext: No edema      All labs, Imaging and EKGs personally reviewed                           12.3   35.15 )-----------( 255      ( 07 Jan 2019 08:20 )             36.3               01-07    140  |  107  |  9   ----------------------------<  162<H>  4.0   |  20<L>  |  0.69    Ca    9.2      07 Jan 2019 06:16    TPro  6.8  /  Alb  3.5  /  TBili  0.3  /  DBili  x   /  AST  239<H>  /  ALT  287<H>  /  AlkPhos  113  01-07

## 2019-01-07 NOTE — CONSULT NOTE ADULT - ASSESSMENT
23 year old female with no significant PMHx other than daily alcohol consumption for the past 2 months who developed acute sore throat, myalgias, rigors, fever, and abdominal pain. Workup positive for strep A with ct neck demonstrating tonsilitis.   Suspect etiology of transaminitis is related to acute infectious process. No recent tylenol abuse or new medications. EBV serology consistent with past infection. Presentation not consistent with alcoholic hepatitis.  -agree to check abdominal ultrasound, acute hepatitis panel, and CMV serology  -trend LFTs daily  -Will Follow  Above discussed with Dr. Rodrigues 23 year old female with no significant PMHx other than daily alcohol consumption for the past 2 months who developed acute sore throat, myalgias, rigors, fever, and abdominal pain. Workup positive for strep A with ct neck demonstrating tonsilitis.   Suspect etiology of transaminitis is related to acute infectious process. No recent tylenol abuse or new medications. EBV serology consistent with past infection. Presentation not consistent with alcoholic hepatitis.  -agree to check abdominal ultrasound, acute hepatitis panel, and CMV serology  -trend LFTs daily  -follow up bcx  -Will Follow  Above discussed with Dr. Rodrigues

## 2019-01-07 NOTE — CONSULT NOTE ADULT - ASSESSMENT
Patient admitted with fevers, tonsil swelling, body aches.  She has a significant leucocytosis, tonsillitis and cervical adenopathy.  She is on iv antibiotics and is feeling a little better.  She will be seen by ID to decide if she can go home with oral antibiotics or if she needs to stay here for IV antibiotics.  From hem/onc perspective suspect that this is all reactive and will assess for resolution once the infection has been treated appropriately.  She does have elevated liver enzymes which is also likely due to the infection and she will be having an abdominal ultrasound.  If she does go home then she was given the office information to f/u in 1-2 weeks to assess for resolution of above abnormalities.  She does understand what was discussed and agrees with the plan.

## 2019-01-08 LAB
ALBUMIN SERPL ELPH-MCNC: 3.2 G/DL — LOW (ref 3.3–5)
ALP SERPL-CCNC: 144 U/L — HIGH (ref 40–120)
ALT FLD-CCNC: 743 U/L — HIGH (ref 10–45)
APAP SERPL-MCNC: <15 UG/ML — SIGNIFICANT CHANGE UP (ref 10–30)
APPEARANCE UR: CLEAR — SIGNIFICANT CHANGE UP
AST SERPL-CCNC: 674 U/L — HIGH (ref 10–40)
BASOPHILS # BLD AUTO: 0 K/UL — SIGNIFICANT CHANGE UP (ref 0–0.2)
BASOPHILS NFR BLD AUTO: 0 % — SIGNIFICANT CHANGE UP (ref 0–2)
BILIRUB DIRECT SERPL-MCNC: <0.1 MG/DL — SIGNIFICANT CHANGE UP (ref 0–0.2)
BILIRUB INDIRECT FLD-MCNC: >0.1 MG/DL — LOW (ref 0.2–1)
BILIRUB SERPL-MCNC: 0.2 MG/DL — SIGNIFICANT CHANGE UP (ref 0.2–1.2)
BILIRUB UR-MCNC: NEGATIVE — SIGNIFICANT CHANGE UP
CMV IGG FLD QL: <0.2 U/ML — SIGNIFICANT CHANGE UP
CMV IGG SERPL-IMP: NEGATIVE — SIGNIFICANT CHANGE UP
CMV IGM FLD-ACNC: <8 AU/ML — SIGNIFICANT CHANGE UP
CMV IGM SERPL QL: NEGATIVE — SIGNIFICANT CHANGE UP
COLOR SPEC: YELLOW — SIGNIFICANT CHANGE UP
DIFF PNL FLD: NEGATIVE — SIGNIFICANT CHANGE UP
EOSINOPHIL # BLD AUTO: 0.1 K/UL — SIGNIFICANT CHANGE UP (ref 0–0.5)
EOSINOPHIL NFR BLD AUTO: 0.3 % — SIGNIFICANT CHANGE UP (ref 0–6)
GLUCOSE UR QL: NEGATIVE MG/DL — SIGNIFICANT CHANGE UP
HAV IGM SER-ACNC: SIGNIFICANT CHANGE UP
HBV CORE IGM SER-ACNC: SIGNIFICANT CHANGE UP
HBV SURFACE AG SER-ACNC: SIGNIFICANT CHANGE UP
HCT VFR BLD CALC: 36.8 % — SIGNIFICANT CHANGE UP (ref 34.5–45)
HCV AB S/CO SERPL IA: 0.13 S/CO — SIGNIFICANT CHANGE UP
HCV AB SERPL-IMP: SIGNIFICANT CHANGE UP
HGB BLD-MCNC: 12.8 G/DL — SIGNIFICANT CHANGE UP (ref 11.5–15.5)
IRON SATN MFR SERPL: 167 UG/DL — HIGH (ref 30–160)
IRON SATN MFR SERPL: SIGNIFICANT CHANGE UP (ref 14–50)
KETONES UR-MCNC: NEGATIVE — SIGNIFICANT CHANGE UP
LEUKOCYTE ESTERASE UR-ACNC: NEGATIVE — SIGNIFICANT CHANGE UP
LYMPHOCYTES # BLD AUTO: 1.9 K/UL — SIGNIFICANT CHANGE UP (ref 1–3.3)
LYMPHOCYTES # BLD AUTO: 7.5 % — LOW (ref 13–44)
MCHC RBC-ENTMCNC: 33.3 PG — SIGNIFICANT CHANGE UP (ref 27–34)
MCHC RBC-ENTMCNC: 34.8 GM/DL — SIGNIFICANT CHANGE UP (ref 32–36)
MCV RBC AUTO: 95.6 FL — SIGNIFICANT CHANGE UP (ref 80–100)
MONOCYTES # BLD AUTO: 0.9 K/UL — SIGNIFICANT CHANGE UP (ref 0–0.9)
MONOCYTES NFR BLD AUTO: 3.5 % — SIGNIFICANT CHANGE UP (ref 2–14)
NEUTROPHILS # BLD AUTO: 22.7 K/UL — HIGH (ref 1.8–7.4)
NEUTROPHILS NFR BLD AUTO: 88.7 % — HIGH (ref 43–77)
NITRITE UR-MCNC: NEGATIVE — SIGNIFICANT CHANGE UP
PH UR: 6 — SIGNIFICANT CHANGE UP (ref 5–8)
PLATELET # BLD AUTO: 279 K/UL — SIGNIFICANT CHANGE UP (ref 150–400)
PROT SERPL-MCNC: 5.9 G/DL — LOW (ref 6–8.3)
PROT UR-MCNC: ABNORMAL MG/DL
RBC # BLD: 3.85 M/UL — SIGNIFICANT CHANGE UP (ref 3.8–5.2)
RBC # FLD: 11.7 % — SIGNIFICANT CHANGE UP (ref 10.3–14.5)
SP GR SPEC: 1.03 — HIGH (ref 1.01–1.02)
TIBC SERPL-MCNC: SIGNIFICANT CHANGE UP (ref 220–430)
UIBC SERPL-MCNC: <20 UG/DL — LOW (ref 110–370)
UROBILINOGEN FLD QL: 1 MG/DL — SIGNIFICANT CHANGE UP
WBC # BLD: 25.6 K/UL — HIGH (ref 3.8–10.5)
WBC # FLD AUTO: 25.6 K/UL — HIGH (ref 3.8–10.5)

## 2019-01-08 PROCEDURE — 76705 ECHO EXAM OF ABDOMEN: CPT | Mod: 26,RT

## 2019-01-08 PROCEDURE — 78226 HEPATOBILIARY SYSTEM IMAGING: CPT | Mod: 26

## 2019-01-08 PROCEDURE — 93010 ELECTROCARDIOGRAM REPORT: CPT

## 2019-01-08 RX ADMIN — AMPICILLIN SODIUM AND SULBACTAM SODIUM 200 GRAM(S): 250; 125 INJECTION, POWDER, FOR SUSPENSION INTRAMUSCULAR; INTRAVENOUS at 05:19

## 2019-01-08 RX ADMIN — AMPICILLIN SODIUM AND SULBACTAM SODIUM 200 GRAM(S): 250; 125 INJECTION, POWDER, FOR SUSPENSION INTRAMUSCULAR; INTRAVENOUS at 17:37

## 2019-01-08 RX ADMIN — AMPICILLIN SODIUM AND SULBACTAM SODIUM 200 GRAM(S): 250; 125 INJECTION, POWDER, FOR SUSPENSION INTRAMUSCULAR; INTRAVENOUS at 12:44

## 2019-01-08 RX ADMIN — SODIUM CHLORIDE 100 MILLILITER(S): 9 INJECTION INTRAMUSCULAR; INTRAVENOUS; SUBCUTANEOUS at 12:47

## 2019-01-08 RX ADMIN — SODIUM CHLORIDE 100 MILLILITER(S): 9 INJECTION INTRAMUSCULAR; INTRAVENOUS; SUBCUTANEOUS at 17:40

## 2019-01-08 RX ADMIN — Medication 40 MILLIGRAM(S): at 08:48

## 2019-01-08 RX ADMIN — Medication 40 MILLIGRAM(S): at 22:07

## 2019-01-08 RX ADMIN — SODIUM CHLORIDE 100 MILLILITER(S): 9 INJECTION INTRAMUSCULAR; INTRAVENOUS; SUBCUTANEOUS at 08:49

## 2019-01-08 RX ADMIN — PANTOPRAZOLE SODIUM 40 MILLIGRAM(S): 20 TABLET, DELAYED RELEASE ORAL at 05:20

## 2019-01-08 RX ADMIN — AMPICILLIN SODIUM AND SULBACTAM SODIUM 200 GRAM(S): 250; 125 INJECTION, POWDER, FOR SUSPENSION INTRAMUSCULAR; INTRAVENOUS at 22:09

## 2019-01-08 NOTE — PROGRESS NOTE ADULT - SUBJECTIVE AND OBJECTIVE BOX
CC: f/u for  tonsillitis  Patient reports  throat hurts  REVIEW OF SYSTEMS:  All other review of systems negative (Comprehensive ROS)    Antimicrobials Day #  :2  ampicillin/sulbactam  IVPB 3 Gram(s) IV Intermittent every 6 hours    Other Medications Reviewed    T(F): 98 (01-08-19 @ 10:41), Max: 98.3 (01-07-19 @ 16:07)  HR: 83 (01-08-19 @ 10:41)  BP: 101/66 (01-08-19 @ 10:41)  RR: 18 (01-08-19 @ 10:41)  SpO2: 95% (01-08-19 @ 10:41)  Wt(kg): --    PHYSICAL EXAM:  General: alert, no acute distress  Eyes:  anicteric, no conjunctival injection, no discharge  Oropharynx: exudative pharyngitis with large tonsils symmetric	  Neck: supple, without adenopathy  Lungs: clear to auscultation  Heart: regular rate and rhythm; no murmur, rubs or gallops  Abdomen: soft, nondistended, nontender, without mass or organomegaly  Skin: no lesions  Extremities: no clubbing, cyanosis, or edema  Neurologic: alert, oriented, moves all extremities    LAB RESULTS:                        12.3   35.15 )-----------( 255      ( 07 Jan 2019 08:20 )             36.3     01-07    140  |  107  |  9   ----------------------------<  162<H>  4.0   |  20<L>  |  0.69    Ca    9.2      07 Jan 2019 06:16    TPro  5.9<L>  /  Alb  3.2<L>  /  TBili  0.2  /  DBili  <0.1  /  AST  674<H>  /  ALT  743<H>  /  AlkPhos  144<H>  01-08    LIVER FUNCTIONS - ( 08 Jan 2019 07:50 )  Alb: 3.2 g/dL / Pro: 5.9 g/dL / ALK PHOS: 144 U/L / ALT: 743 U/L / AST: 674 U/L / GGT: x             MICROBIOLOGY:  RECENT CULTURES:  01-06 @ 22:15 .Blood Blood-Peripheral     No growth to date.          RADIOLOGY REVIEWED:    < from: US Abdomen Upper Quadrant Right (01.08.19 @ 11:45) >  IMPRESSION:     Mild hepatomegaly.    Diffuse gallbladder wall thickening and edema. Differential diagnosis   includes acalculous cholecystitis as well as other etiologies such as   hepatitis.     Trace pericholecystic fluid. Small right pleural effusion.      < end of copied text >    < from: CT Neck Soft Tissue w/ IV Cont (01.06.19 @ 15:49) >  Impression: Enlargement of the tonsils and uvula consistent with   tonsillitis. No evidence of abscess. Cervical lymphadenopathy.    < end of copied text >    Assessment:  Patient with exudative tonsillapharyngitis with group A strept. Hepatitis may be from sepsis, recent viral uri  with toxins she has taken in- tylenol and alcohol or from viral hepatitis. Await gi f/u but she does not behave like cholecystitis to me  Plan:  continue unasyn  follow up cultures  follow up hepatitis panel and cmv panel and pcr  check tylenol level since she took a lot of theraflu prior to admission  gi f/u CC: f/u for  tonsillitis  Patient reports  throat hurts  REVIEW OF SYSTEMS:  All other review of systems negative (Comprehensive ROS)    Antimicrobials Day #  :2  ampicillin/sulbactam  IVPB 3 Gram(s) IV Intermittent every 6 hours    Other Medications Reviewed    T(F): 98 (01-08-19 @ 10:41), Max: 98.3 (01-07-19 @ 16:07)  HR: 83 (01-08-19 @ 10:41)  BP: 101/66 (01-08-19 @ 10:41)  RR: 18 (01-08-19 @ 10:41)  SpO2: 95% (01-08-19 @ 10:41)  Wt(kg): --    PHYSICAL EXAM:  General: alert, no acute distress  Eyes:  anicteric, no conjunctival injection, no discharge  Oropharynx: exudative pharyngitis with large tonsils symmetric	  Neck: supple, without adenopathy  Lungs: clear to auscultation  Heart: regular rate and rhythm; no murmur, rubs or gallops  Abdomen: soft, nondistended, nontender, without mass or organomegaly  Skin: no lesions  Extremities: no clubbing, cyanosis, or edema  Neurologic: alert, oriented, moves all extremities    LAB RESULTS:                        12.3   35.15 )-----------( 255      ( 07 Jan 2019 08:20 )             36.3     01-07    140  |  107  |  9   ----------------------------<  162<H>  4.0   |  20<L>  |  0.69    Ca    9.2      07 Jan 2019 06:16    TPro  5.9<L>  /  Alb  3.2<L>  /  TBili  0.2  /  DBili  <0.1  /  AST  674<H>  /  ALT  743<H>  /  AlkPhos  144<H>  01-08    LIVER FUNCTIONS - ( 08 Jan 2019 07:50 )  Alb: 3.2 g/dL / Pro: 5.9 g/dL / ALK PHOS: 144 U/L / ALT: 743 U/L / AST: 674 U/L / GGT: x             MICROBIOLOGY:  RECENT CULTURES:  01-06 @ 22:15 .Blood Blood-Peripheral     No growth to date.          RADIOLOGY REVIEWED:    < from: US Abdomen Upper Quadrant Right (01.08.19 @ 11:45) >  IMPRESSION:     Mild hepatomegaly.    Diffuse gallbladder wall thickening and edema. Differential diagnosis   includes acalculous cholecystitis as well as other etiologies such as   hepatitis.     Trace pericholecystic fluid. Small right pleural effusion.      < end of copied text >    < from: CT Neck Soft Tissue w/ IV Cont (01.06.19 @ 15:49) >  Impression: Enlargement of the tonsils and uvula consistent with   tonsillitis. No evidence of abscess. Cervical lymphadenopathy.    < end of copied text >    Assessment:  Patient with exudative tonsillapharyngitis with group A strept. Hepatitis may be from sepsis, recent viral uri  with toxins she has taken in- tylenol and alcohol or from viral hepatitis. Await gi f/u but she does not behave like cholecystitis to me  Plan:  continue unasyn  follow up cultures  follow up hepatitis panel and cmv panel and pcr  check tylenol level since she took a lot of theraflu prior to admission  gi f/u  favor ent eval

## 2019-01-08 NOTE — PROVIDER CONTACT NOTE (OTHER) - ASSESSMENT
Pt denies pain or discomfort upon urination. States she has never had a UTI before. Described urine as bessy in color.

## 2019-01-08 NOTE — PROGRESS NOTE ADULT - SUBJECTIVE AND OBJECTIVE BOX
SUBJECTIVE: Swallowing improved. Remains afebrile. Admits to fleeting RUQ pains overnight and now with very mild dull RUQ discomfort. Tolerating PO (just finished lunch) without worsening abdominal pain, nausea, emesis.    MEDICATIONS  (STANDING):  ampicillin/sulbactam  IVPB 3 Gram(s) IV Intermittent every 6 hours  enoxaparin Injectable 40 milliGRAM(s) SubCutaneous daily  methylPREDNISolone sodium succinate Injectable 40 milliGRAM(s) IV Push every 12 hours  pantoprazole    Tablet 40 milliGRAM(s) Oral before breakfast  sodium chloride 0.9%. 1000 milliLiter(s) (100 mL/Hr) IV Continuous <Continuous>    OBJECTIVE:  Vital Signs Last 24 Hrs  T(C): 36.7 (08 Jan 2019 10:41), Max: 36.8 (07 Jan 2019 16:07)  T(F): 98 (08 Jan 2019 10:41), Max: 98.3 (07 Jan 2019 16:07)  HR: 83 (08 Jan 2019 10:41) (52 - 86)  BP: 101/66 (08 Jan 2019 10:41) (93/50 - 101/66)  BP(mean): --  RR: 18 (08 Jan 2019 10:41) (18 - 18)  SpO2: 95% (08 Jan 2019 10:41) (94% - 97%)    PHYSICAL EXAM:  Constitutional: A&OX3, in NAD,   HEENT: NCAT, no scleral icterus, no palpable LAD  Cardiovascular: RRR, S1 and S2, no audible murmurs  Respiratory: CTAB   Gastrointestinal: soft, focally tender to deep palpation in the RUQ, no rebound or guarding, nondistended, normoactive bowel sounds, no palpable HSM  Extremities: No peripheral edema b/l     LABS:                        12.3   35.15 )-----------( 255      ( 07 Jan 2019 08:20 )             36.3     01-07    140  |  107  |  9   ----------------------------<  162<H>  4.0   |  20<L>  |  0.69    Ca    9.2      07 Jan 2019 06:16    TPro  5.9<L>  /  Alb  3.2<L>  /  TBili  0.2  /  DBili  <0.1  /  AST  674<H>  /  ALT  743<H>  /  AlkPhos  144<H>  01-08      LIVER FUNCTIONS - ( 08 Jan 2019 07:50 )  Alb: 3.2 g/dL / Pro: 5.9 g/dL / ALK PHOS: 144 U/L / ALT: 743 U/L / AST: 674 U/L / GGT: x           RADIOLOGY & ADDITIONAL TESTS:  < from: US Abdomen Upper Quadrant Right (01.08.19 @ 11:45) >  IMPRESSION:     Mild hepatomegaly.    Diffuse gallbladder wall thickening and edema. Differential diagnosis   includes acalculous cholecystitis as well as other etiologies such as   hepatitis.     Trace pericholecystic fluid. Small right pleural effusion.      < end of copied text >

## 2019-01-08 NOTE — PROGRESS NOTE ADULT - SUBJECTIVE AND OBJECTIVE BOX
Subjective: Patient seen and examined. No new events except as noted.   feeling better, tolerating diet     REVIEW OF SYSTEMS:    CONSTITUTIONAL: No weakness, fevers or chills  EYES/ENT: No visual changes;  No vertigo or throat pain   NECK: No pain or stiffness  RESPIRATORY: No cough, wheezing, hemoptysis; No shortness of breath  CARDIOVASCULAR: No chest pain or palpitations  GASTROINTESTINAL: No abdominal or epigastric pain. No nausea, vomiting, or hematemesis; No diarrhea or constipation. No melena or hematochezia.  GENITOURINARY: No dysuria, frequency or hematuria  NEUROLOGICAL: No numbness or weakness  SKIN: No itching, burning, rashes, or lesions   All other review of systems is negative unless indicated above.    MEDICATIONS:  MEDICATIONS  (STANDING):  ampicillin/sulbactam  IVPB 3 Gram(s) IV Intermittent every 6 hours  enoxaparin Injectable 40 milliGRAM(s) SubCutaneous daily  methylPREDNISolone sodium succinate Injectable 40 milliGRAM(s) IV Push every 12 hours  pantoprazole    Tablet 40 milliGRAM(s) Oral before breakfast  sodium chloride 0.9%. 1000 milliLiter(s) (100 mL/Hr) IV Continuous <Continuous>      PHYSICAL EXAM:  T(C): 36.7 (19 @ 10:41), Max: 36.8 (19 @ 16:07)  HR: 83 (19 @ 10:41) (52 - 86)  BP: 101/66 (19 @ 10:41) (93/50 - 101/66)  RR: 18 (19 @ 10:41) (18 - 18)  SpO2: 95% (19 @ 10:41) (94% - 97%)  Wt(kg): --  I&O's Summary    2019 07:  -  2019 07:00  --------------------------------------------------------  IN: 1580 mL / OUT: 0 mL / NET: 1580 mL    2019 07:  -  2019 16:01  --------------------------------------------------------  IN: 800 mL / OUT: 0 mL / NET: 800 mL          Appearance: Normal	  HEENT:   improved tonsilar swelling   Lymphatic: No lymphadenopathy , no edema  Cardiovascular: Normal S1 S2, No JVD, No murmurs , Peripheral pulses palpable 2+ bilaterally  Respiratory: Lungs clear to auscultation, normal effort 	  Gastrointestinal:  Soft, RUQ tenderness on palpation  Skin: No rashes, No ecchymoses, No cyanosis, warm to touch  Musculoskeletal: Normal range of motion, normal strength  Psychiatry:  Mood & affect appropriate  Ext: No edema      All labs, Imaging and EKGs personally reviewed                           12.8   25.6  )-----------( 279      ( 2019 14:35 )             36.8               -07    140  |  107  |  9   ----------------------------<  162<H>  4.0   |  20<L>  |  0.69    Ca    9.2      2019 06:16    TPro  5.9<L>  /  Alb  3.2<L>  /  TBili  0.2  /  DBili  <0.1  /  AST  674<H>  /  ALT  743<H>  /  AlkPhos  144<H>                         Urinalysis Basic - ( 2019 13:26 )    Color: Yellow / Appearance: Clear / S.031 / pH: x  Gluc: x / Ketone: Negative  / Bili: Negative / Urobili: 1.0 mg/dL   Blood: x / Protein: Trace mg/dL / Nitrite: Negative   Leuk Esterase: Negative / RBC: x / WBC x   Sq Epi: x / Non Sq Epi: x / Bacteria: x    < from: US Abdomen Upper Quadrant Right (19 @ 11:45) >  IMPRESSION:     Mild hepatomegaly.    Diffuse gallbladder wall thickening and edema. Differential diagnosis   includes acalculous cholecystitis as well as other etiologies such as   hepatitis.     Trace pericholecystic fluid. Small right pleural effusion.    < end of copied text >

## 2019-01-08 NOTE — PROGRESS NOTE ADULT - ASSESSMENT
1) Leukocytosis- likely reactive. Patient admitted with fevers, tonsil swelling, body aches.  She has a significant  tonsillitis and cervical adenopathy.  She is on iv antibiotics and is feeling a  better. She is also on steroids.  -  From hem/onc perspective suspect that this is all reactive and will assess for resolution once the infection has been treated appropriately.    2) GI- Elevated liver enzymes which is also likely due to the infection . + constipation.  GI and ID following 1) Leukocytosis- likely reactive. Patient admitted with fevers, tonsil swelling, body aches.  She has a significant  tonsillitis and cervical adenopathy.  She is on iv antibiotics and is feeling a  better. She is also on steroids.  -  From hem/onc perspective suspect that this is all reactive and will assess for resolution once the infection has been treated appropriately.    2) GI- Elevated liver enzymes which is also likely due to the infection . + constipation.  GI and ID following  -an abdominal sonogram has been ordered

## 2019-01-08 NOTE — PROGRESS NOTE ADULT - SUBJECTIVE AND OBJECTIVE BOX
JUDITH NASH  MRN-[id number]    Patient is a 23y old  Female who presents with a chief complaint of tonsilitis, leukocytosis (07 Jan 2019 23:47)      Review of System    Feels a bit better, though still with sore throat and cough.  Also reports some constipation, last bm was 3 days ago, mildly bloated    Current Meds  MEDICATIONS  (STANDING):  ampicillin/sulbactam  IVPB 3 Gram(s) IV Intermittent every 6 hours  enoxaparin Injectable 40 milliGRAM(s) SubCutaneous daily  methylPREDNISolone sodium succinate Injectable 40 milliGRAM(s) IV Push every 12 hours  pantoprazole    Tablet 40 milliGRAM(s) Oral before breakfast  sodium chloride 0.9%. 1000 milliLiter(s) (100 mL/Hr) IV Continuous <Continuous>    MEDICATIONS  (PRN):      Vitals  Vital Signs Last 24 Hrs  T(C): 36.6 (07 Jan 2019 19:56), Max: 36.8 (07 Jan 2019 08:25)  T(F): 97.9 (07 Jan 2019 19:56), Max: 98.3 (07 Jan 2019 16:07)  HR: 54 (07 Jan 2019 19:56) (54 - 90)  BP: 96/53 (07 Jan 2019 19:56) (93/50 - 102/69)  BP(mean): --  RR: 18 (07 Jan 2019 19:56) (18 - 18)  SpO2: 95% (07 Jan 2019 19:56) (94% - 99%)    Physical Exam     NAD    Lab  CBC Full  -  ( 07 Jan 2019 08:20 )  WBC Count : 35.15 K/uL  Hemoglobin : 12.3 g/dL  Hematocrit : 36.3 %  Platelet Count - Automated : 255 K/uL  Mean Cell Volume : 93.8 fl  Mean Cell Hemoglobin : 31.8 pg  Mean Cell Hemoglobin Concentration : 33.9 gm/dL  Auto Neutrophil # : 31.28 K/uL  Auto Lymphocyte # : 1.76 K/uL  Auto Monocyte # : 2.11 K/uL  Auto Eosinophil # : 0.00 K/uL  Auto Basophil # : 0.00 K/uL  Auto Neutrophil % : 86.0 %  Auto Lymphocyte % : 5.0 %  Auto Monocyte % : 6.0 %  Auto Eosinophil % : 0.0 %  Auto Basophil % : 0.0 %    01-07    140  |  107  |  9   ----------------------------<  162<H>  4.0   |  20<L>  |  0.69    Ca    9.2      07 Jan 2019 06:16    TPro  6.8  /  Alb  3.5  /  TBili  0.3  /  DBili  x   /  AST  239<H>  /  ALT  287<H>  /  AlkPhos  113  01-07        Rad:    Assessment/Plan

## 2019-01-08 NOTE — PROGRESS NOTE ADULT - ASSESSMENT
23 year old female with no significant PMHx other than daily alcohol consumption for the past 2 months who developed acute sore throat, myalgias, rigors, fever, and abdominal pain. Workup positive for strep A with ct neck demonstrating tonsilitis. Clinically improving with IV antibiotics and steroids. However, her transaminitis is significantly worse today and she is developing more persistent, though mild, RUQ abdominal pain. Ultrasound concerning for acalculous cholecystitis.   -HIDA scan ordered for this pm. Keep NPO as of now  -surgical consultation  -await serum ferritin, acute hepatitis panel, CMV serology  -trend CBC. Continue antibiotics.  Above discussed with Dr. Rodrigues and pt in detail 23 year old female with no significant PMHx other than daily alcohol consumption for the past 2 months who developed acute sore throat, myalgias, rigors, fever, and abdominal pain. Workup positive for strep A with ct neck demonstrating tonsilitis. Clinically improving with IV antibiotics and steroids. BCx negative. However, her transaminitis is significantly worse today and she is developing more persistent, though mild, RUQ abdominal pain. Ultrasound concerning for acalculous cholecystitis.   -HIDA scan ordered for this pm. Keep NPO as of now  -surgical consultation  -await serum ferritin, acute hepatitis panel, CMV serology  -trend CBC. Continue antibiotics.  Above discussed with Dr. Rodrigues and pt in detail

## 2019-01-08 NOTE — CONSULT NOTE ADULT - ASSESSMENT
Pradeep Gray is a23 y.o. woman no significant PMHx or PSHx who was admitted on 1/6 with a significatnt leukocytosis of 30 and no specific source. Being treated for group A strep pharyngitis, but unlikely to explain all of symptoms or lab results, including transaminitis. GB diffusely thickened, but no postpneumonic for acute cholecystis. Differential included acalculous cholecystitis.     Plan:  - HIDA  - Will discuss with attending    Rodney Peter, PGY2  [id number] Pradeep Gray is a23 y.o. woman no significant PMHx or PSHx who was admitted on 1/6 with a significatnt leukocytosis of 30 and no specific source. Being treated for group A strep pharyngitis, but unlikely to explain all of symptoms or lab results, including transaminitis. EBV also positive. Hepatitis panel negative. GB diffusely thickened, but no postpneumonic for acute cholecystis. Differential included acalculous cholecystitis.     Plan:  - HIDA  - Fill evaluate further pending NM scan  - Discussed with Dr. Osito Peter, PGY2  [id number] Pradeep Gray is a23 y.o. woman no significant PMHx or PSHx who was admitted on 1/6 with a significatnt leukocytosis of 30 and no specific source. Being treated for group A strep pharyngitis, but unlikely to explain all of symptoms or lab results, including transaminitis. EBV also positive. Hepatitis panel negative. GB diffusely thickened, but no postpneumonic for acute cholecystis. Differential included acalculous cholecystitis.     Plan:  - F/u HIDA results  - Will evaluate further pending NM scan - patient tentatively added on to OR schedule tomorrow for lap cholecystectomy  - Please keep patient [id number], pre-opd  - Discussed with Dr. Osito Peter, PGY2  [id number]

## 2019-01-08 NOTE — CHART NOTE - NSCHARTNOTEFT_GEN_A_CORE
24 y/o female  admitted with  pharyngitis on Unasyn, noted with elevated LFTs. US abd: showed Mild hepatomegaly. Diffuse gallbladder wall thickening and edema. GI follow-up appreciated, HIDA pending. Pt noted with asymptomatic sinus bradycardia ( HR  42 on EKG, no st elevation/depression);. Pt reported that she runs for miles daily;  bradycardia d/w Dr. Chu, advised to monitor for now. Dr. Chu will inform Dr. Juarez-cardiology.

## 2019-01-08 NOTE — CONSULT NOTE ADULT - SUBJECTIVE AND OBJECTIVE BOX
General Surgery Consult      Consulting surgical team: Red  Consulting attending: Dr. Mcneill       HPI: Pradeep Gray is a23 y.o. woman no significant PMHx or PSHx who was admitted on 1/6 with a significatnt leukocytosis of 30 and no specific source. At the time of presentation, the patient complained of sore throat x4 days, odynophagia, myalgia, headache, RUQ abdominal pain, and back pain.  The patient attempted to treat her symptoms with Advil and OTC nasal spray that has not improved symptoms.      The patient was positive for group A strep and is being treated presumptively for strep pharyngitis. However, this yousif not explain elevated LFTs. The patient is currently being evaluated by heme/onc, GI, ID, and medicine. General surgery consulted to r/o cholecystitis or other biliary etiology of symptoms and leukocytosis.       PAST MEDICAL HISTORY:  No pertinent past medical history      PAST SURGICAL HISTORY:  No significant past surgical history      MEDICATIONS:  ampicillin/sulbactam  IVPB 3 Gram(s) IV Intermittent every 6 hours  enoxaparin Injectable 40 milliGRAM(s) SubCutaneous daily  methylPREDNISolone sodium succinate Injectable 40 milliGRAM(s) IV Push every 12 hours  pantoprazole    Tablet 40 milliGRAM(s) Oral before breakfast  sodium chloride 0.9%. 1000 milliLiter(s) IV Continuous <Continuous>      ALLERGIES:  No Known Allergies      VITALS & I/Os:  Vital Signs Last 24 Hrs  T(C): 36.7 (08 Jan 2019 10:41), Max: 36.8 (07 Jan 2019 16:07)  T(F): 98 (08 Jan 2019 10:41), Max: 98.3 (07 Jan 2019 16:07)  HR: 83 (08 Jan 2019 10:41) (52 - 86)  BP: 101/66 (08 Jan 2019 10:41) (93/50 - 101/66)  BP(mean): --  RR: 18 (08 Jan 2019 10:41) (18 - 18)  SpO2: 95% (08 Jan 2019 10:41) (94% - 97%)      I&O's Summary    07 Jan 2019 07:01  -  08 Jan 2019 07:00  --------------------------------------------------------  IN: 1580 mL / OUT: 0 mL / NET: 1580 mL    08 Jan 2019 07:01  -  08 Jan 2019 14:14  --------------------------------------------------------  IN: 800 mL / OUT: 0 mL / NET: 800 mL      PHYSICAL EXAM:  General: No acute distress  Respiratory: Nonlabored  Cardiovascular: RRR  Abdominal: Soft, nondistended, nontender. No rebound or guarding. No organomegaly, no palpable mass.  Extremities: Warm      LABS:                        12.3   35.15 )-----------( 255      ( 07 Jan 2019 08:20 )             36.3     01-07    140  |  107  |  9   ----------------------------<  162<H>  4.0   |  20<L>  |  0.69    Ca    9.2      07 Jan 2019 06:16    TPro  5.9<L>  /  Alb  3.2<L>  /  TBili  0.2  /  DBili  <0.1  /  AST  674<H>  /  ALT  743<H>  /  AlkPhos  144<H>  01-08      IMAGING:  CT Neck Soft Tissue w/ IV Cont (01.06.19 @ 15:49)   The tonsils are enlarged but there is no evidence of peritonsillar   abscess. The uvula is enlarged. The epiglottis is normal. The salivary   glands are normal.    The true and false cords are normal. There is normal vascular   enhancement. There is moderate level 2 cervical lymphadenopathy.    The globes, extraocular muscles and optic nerves are unremarkable. Mild   mucosal thickening of the inferior maxillary sinuses are identified, the   ethmoid sphenoid and frontal sinuses are clear.    There is reversal the normal cervical curvature at the C5-6 level which   may be related to muscle spasm.    Impression: Enlargement of the tonsils and uvula consistent with   tonsillitis. No evidence of abscess. Cervical lymphadenopathy.    US Abdomen Upper Quadrant Right (01.08.19 @ 11:45)   Liver: Mildly enlarged, measuring 18.3 cm in length. Smooth contour.     Bile ducts: Normal caliber. Common bile duct measures 2 mm.     Gallbladder: Diffusely thickened, edematous gallbladder wall, measuring   up to 5 mm. No cholelithiasis.     Pancreas: Visualized portions are within normal limits.    Right kidney: 11.3 cm. No hydronephrosis.     Ascites: Trace pericholecystic fluid.    IVC: Visualized portions are within normal limits.    Miscellaneous: Small right pleural effusion.    IMPRESSION:     Mild hepatomegaly.    Diffuse gallbladder wall thickening and edema. Differential diagnosis   includes acalculous cholecystitis as well as other etiologies such as   hepatitis.     Trace pericholecystic fluid. Small right pleural effusion.

## 2019-01-09 ENCOUNTER — TRANSCRIPTION ENCOUNTER (OUTPATIENT)
Age: 24
End: 2019-01-09

## 2019-01-09 LAB
ALBUMIN SERPL ELPH-MCNC: 2.9 G/DL — LOW (ref 3.3–5)
ALP SERPL-CCNC: 120 U/L — SIGNIFICANT CHANGE UP (ref 40–120)
ALT FLD-CCNC: 583 U/L — HIGH (ref 10–45)
ANION GAP SERPL CALC-SCNC: 11 MMOL/L — SIGNIFICANT CHANGE UP (ref 5–17)
AST SERPL-CCNC: 194 U/L — HIGH (ref 10–40)
BILIRUB SERPL-MCNC: 0.3 MG/DL — SIGNIFICANT CHANGE UP (ref 0.2–1.2)
BLD GP AB SCN SERPL QL: NEGATIVE — SIGNIFICANT CHANGE UP
BUN SERPL-MCNC: 13 MG/DL — SIGNIFICANT CHANGE UP (ref 7–23)
CALCIUM SERPL-MCNC: 8.2 MG/DL — LOW (ref 8.4–10.5)
CHLORIDE SERPL-SCNC: 109 MMOL/L — HIGH (ref 96–108)
CMV DNA CSF QL NAA+PROBE: SIGNIFICANT CHANGE UP
CO2 SERPL-SCNC: 22 MMOL/L — SIGNIFICANT CHANGE UP (ref 22–31)
CREAT SERPL-MCNC: 0.76 MG/DL — SIGNIFICANT CHANGE UP (ref 0.5–1.3)
FERRITIN SERPL-MCNC: 3992 NG/ML — HIGH (ref 15–150)
GLUCOSE SERPL-MCNC: 90 MG/DL — SIGNIFICANT CHANGE UP (ref 70–99)
HCG SERPL-ACNC: <2 MIU/ML — SIGNIFICANT CHANGE UP
HCT VFR BLD CALC: 33.3 % — LOW (ref 34.5–45)
HGB BLD-MCNC: 11.2 G/DL — LOW (ref 11.5–15.5)
INR BLD: 1.11 RATIO — SIGNIFICANT CHANGE UP (ref 0.88–1.16)
MAGNESIUM SERPL-MCNC: 2.1 MG/DL — SIGNIFICANT CHANGE UP (ref 1.6–2.6)
MCHC RBC-ENTMCNC: 31.6 PG — SIGNIFICANT CHANGE UP (ref 27–34)
MCHC RBC-ENTMCNC: 33.6 GM/DL — SIGNIFICANT CHANGE UP (ref 32–36)
MCV RBC AUTO: 94.1 FL — SIGNIFICANT CHANGE UP (ref 80–100)
PHOSPHATE SERPL-MCNC: 3.2 MG/DL — SIGNIFICANT CHANGE UP (ref 2.5–4.5)
PLATELET # BLD AUTO: 270 K/UL — SIGNIFICANT CHANGE UP (ref 150–400)
POTASSIUM SERPL-MCNC: 3.7 MMOL/L — SIGNIFICANT CHANGE UP (ref 3.5–5.3)
POTASSIUM SERPL-SCNC: 3.7 MMOL/L — SIGNIFICANT CHANGE UP (ref 3.5–5.3)
PROT SERPL-MCNC: 5.4 G/DL — LOW (ref 6–8.3)
PROTHROM AB SERPL-ACNC: 12.7 SEC — SIGNIFICANT CHANGE UP (ref 10–13.1)
RBC # BLD: 3.54 M/UL — LOW (ref 3.8–5.2)
RBC # FLD: 13 % — SIGNIFICANT CHANGE UP (ref 10.3–14.5)
RH IG SCN BLD-IMP: POSITIVE — SIGNIFICANT CHANGE UP
SODIUM SERPL-SCNC: 142 MMOL/L — SIGNIFICANT CHANGE UP (ref 135–145)
WBC # BLD: 17.97 K/UL — HIGH (ref 3.8–10.5)
WBC # FLD AUTO: 17.97 K/UL — HIGH (ref 3.8–10.5)

## 2019-01-09 RX ADMIN — SODIUM CHLORIDE 100 MILLILITER(S): 9 INJECTION INTRAMUSCULAR; INTRAVENOUS; SUBCUTANEOUS at 10:58

## 2019-01-09 RX ADMIN — AMPICILLIN SODIUM AND SULBACTAM SODIUM 200 GRAM(S): 250; 125 INJECTION, POWDER, FOR SUSPENSION INTRAMUSCULAR; INTRAVENOUS at 04:59

## 2019-01-09 RX ADMIN — SODIUM CHLORIDE 100 MILLILITER(S): 9 INJECTION INTRAMUSCULAR; INTRAVENOUS; SUBCUTANEOUS at 21:21

## 2019-01-09 RX ADMIN — AMPICILLIN SODIUM AND SULBACTAM SODIUM 200 GRAM(S): 250; 125 INJECTION, POWDER, FOR SUSPENSION INTRAMUSCULAR; INTRAVENOUS at 21:19

## 2019-01-09 RX ADMIN — Medication 40 MILLIGRAM(S): at 10:59

## 2019-01-09 RX ADMIN — AMPICILLIN SODIUM AND SULBACTAM SODIUM 200 GRAM(S): 250; 125 INJECTION, POWDER, FOR SUSPENSION INTRAMUSCULAR; INTRAVENOUS at 10:57

## 2019-01-09 RX ADMIN — AMPICILLIN SODIUM AND SULBACTAM SODIUM 200 GRAM(S): 250; 125 INJECTION, POWDER, FOR SUSPENSION INTRAMUSCULAR; INTRAVENOUS at 16:50

## 2019-01-09 RX ADMIN — SODIUM CHLORIDE 100 MILLILITER(S): 9 INJECTION INTRAMUSCULAR; INTRAVENOUS; SUBCUTANEOUS at 18:00

## 2019-01-09 NOTE — PROGRESS NOTE ADULT - ASSESSMENT
GAS tonsillitis is improving.  Her LFT abnormalities are improving.  it is not clear if theraflu, alcohol, or one xanax tab contributed to picture.  Elevated ferritin level is higher than I would have expected as an acute phase reactant.  ? If she has an underlying inflammatory disorder.  Her leukocytosis is moderating as well.  She looks strikingly non toxic.  Suggest  1.can change to augmentin 875 BID to complete additional 5 days of antibiotics  2.Steroid taper  3,F/U labs next week when discharged  4.Will see in office as OPD after discharge  5.Consider discharge planning for today or 1/10

## 2019-01-09 NOTE — DISCHARGE NOTE ADULT - HOSPITAL COURSE
22 y/o female presenting with severe sore throat and myalgia found to have leukocytosis of 30K admitted with Pharyngitis due to Streptococcus species.ID consulted, started on IV Unasyn and will transitioned to Augmentin 875 BID x 5more days. During hospital course pt c/o adnominal pain found with Transaminitis, GI consulted/sx consulted, US abd noted, and HIDA negative. No surgical intervention. LFTs trending down. Hematology consulted for mild anemia and Leukocytosis-- monitoring for now. WBC trending down. Patient noted with Asymptomatic bradycardia, cardiology consulted. pt reported she runs daily, no intervention at this time per cardiology; outpatient ECHO. Patient to follow-up with her PCP in 5 days for repeat blood work and f/u with Gastroenterologist- Dr. Rodrigues.

## 2019-01-09 NOTE — DISCHARGE NOTE ADULT - MEDICATION SUMMARY - MEDICATIONS TO TAKE
I will START or STAY ON the medications listed below when I get home from the hospital:    Ms. Veronica Gray was hospitalized 1/6/19 -1/10/19. She may resume work on 1/14/19 at full capacity.   -- Indication: For sick note     predniSONE 10 mg oral tablet  -- 4 tab(s) oral - by mouth once a day x 1 days  3 tab(s) oral - by mouth once a day x 1 days  2 tab(s) oral - by mouth once a day x 1 days  1 tab(s) oral - by mouth once a day x 1 days  -- It is very important that you take or use this exactly as directed.  Do not skip doses or discontinue unless directed by your doctor.  Obtain medical advice before taking any non-prescription drugs as some may affect the action of this medication.  Take with food or milk.    -- Indication: For Pharyngitis due to Streptococcus species    Augmentin 875 mg-125 mg oral tablet  -- 875 milligram(s) by mouth every 12 hours   -- Finish all this medication unless otherwise directed by prescriber.  Take with food or milk.    -- Indication: For Pharyngitis due to Streptococcus species    pantoprazole 40 mg oral delayed release tablet  -- 1 tab(s) by mouth once a day (before a meal)  -- Indication: For GI prevention I will START or STAY ON the medications listed below when I get home from the hospital:    Ms. Veronica Gray was hospitalized 1/6/19 -1/10/19. She may resume work on 1/14/19 at full capacity.   -- Indication: For sick note    predniSONE 10 mg oral tablet  -- 4 tab(s) oral - by mouth once a day x 1 days  3 tab(s) oral - by mouth once a day x 1 days  2 tab(s) oral - by mouth once a day x 1 days  1 tab(s) oral - by mouth once a day x 1 days  -- It is very important that you take or use this exactly as directed.  Do not skip doses or discontinue unless directed by your doctor.  Obtain medical advice before taking any non-prescription drugs as some may affect the action of this medication.  Take with food or milk.    -- Indication: For Pharyngitis due to Streptococcus species    Augmentin 875 mg-125 mg oral tablet  -- 875 milligram(s) by mouth every 12 hours   -- Finish all this medication unless otherwise directed by prescriber.  Take with food or milk.    -- Indication: For Pharyngitis due to Streptococcus species    pantoprazole 40 mg oral delayed release tablet  -- 1 tab(s) by mouth once a day (before a meal)  -- Indication: For GI prevention

## 2019-01-09 NOTE — PROGRESS NOTE ADULT - PROBLEM SELECTOR PLAN 3
monitor and trend  trending down   lactate level   IV hydration  Hematology eval appreciated
monitor and trend  trending down   lactate level   IV hydration  Hematology eval appreciated
tender on palpation   EKG  card eval

## 2019-01-09 NOTE — PROGRESS NOTE ADULT - ASSESSMENT
In summary, young female with acute hepatitis, appears either toxic versus infectious in etiology.  Clinically has improved.  No evidence of cholecystitis.  Suspect GB wall thickening related to acute hepatitis.      Plan:    Await LFTs today  Can restart regular diet  IF LFTs start to improve and can tolerate solids, can be discharged to be followed up as outpatient.    Thank you.    Vincent Rodrigues MD

## 2019-01-09 NOTE — PROGRESS NOTE ADULT - SUBJECTIVE AND OBJECTIVE BOX
CC: f/u for GAS tonsillopharyngitis    Patient reports: improvement in throat pain althogh she still has some discomfort on the left side.Appetite is improved, no myalgias    REVIEW OF SYSTEMS:  All other review of systems negative (Comprehensive ROS)    Antimicrobials Day #  :day 4  ampicillin/sulbactam  IVPB 3 Gram(s) IV Intermittent every 6 hours    Other Medications Reviewed  MEDICATIONS  (STANDING):  ampicillin/sulbactam  IVPB 3 Gram(s) IV Intermittent every 6 hours  enoxaparin Injectable 40 milliGRAM(s) SubCutaneous daily  methylPREDNISolone sodium succinate Injectable 40 milliGRAM(s) IV Push daily  pantoprazole    Tablet 40 milliGRAM(s) Oral before breakfast  sodium chloride 0.9%. 1000 milliLiter(s) (100 mL/Hr) IV Continuous <Continuous>    T(F): 99 (19 @ 07:17), Max: 99 (19 @ 07:17)  HR: 54 (19 @ 07:17)  BP: 96/63 (19 @ 07:17)  RR: 18 (19 @ 07:17)  SpO2: 98% (19 @ 07:17)  Wt(kg): --    PHYSICAL EXAM:  General: alert, no acute distress  Eyes:  anicteric, no conjunctival injection, no discharge  Oropharynx: no lesions or injection , enlarged tonsils, no exudates	  Neck: supple, without adenopathy  Lungs: clear to auscultation  Heart: regular rate and rhythm; no murmur, rubs or gallops  Abdomen: soft, nondistended, nontender, without mass or organomegaly  Skin: no lesions  Extremities: no clubbing, cyanosis, or edema  Neurologic: alert, oriented, moves all extremities    LAB RESULTS:                        11.2   17.97 )-----------( 270      ( 2019 08:06 )             33.3     -    142  |  109<H>  |  13  ----------------------------<  90  3.7   |  22  |  0.76    Ca    8.2<L>      2019 07:08  Phos  3.2     -  Mg     2.1     -    TPro  5.4<L>  /  Alb  2.9<L>  /  TBili  0.3  /  DBili  <0.1  /  AST  194<H>  /  ALT  583<H>  /  AlkPhos  120      LIVER FUNCTIONS - ( 2019 07:08 )  Alb: 2.9 g/dL / Pro: 5.4 g/dL / ALK PHOS: 120 U/L / ALT: 583 U/L / AST: 194 U/L / GGT: x           Urinalysis Basic - ( 2019 13:26 )    Color: Yellow / Appearance: Clear / S.031 / pH: x  Gluc: x / Ketone: Negative  / Bili: Negative / Urobili: 1.0 mg/dL   Blood: x / Protein: Trace mg/dL / Nitrite: Negative   Leuk Esterase: Negative / RBC: 1 /HPF / WBC 9 /HPF   Sq Epi: x / Non Sq Epi: 12 /HPF / Bacteria: Negative      MICROBIOLOGY:  RECENT CULTURES:   @ 22:15 .Blood Blood-Peripheral     No growth to date.    CMV IgG and IgM are negative  Ferritin 3900      RADIOLOGY REVIEWED:  < from: [facility] Hepatobiliary Imaging (19 @ 21:40) >  IMPRESSION: Normal hepatobiliary scan.    No evidence of acute cholecystitis.    < end of copied text >

## 2019-01-09 NOTE — DISCHARGE NOTE ADULT - CARE PLAN
Principal Discharge DX:	Pharyngitis due to Streptococcus species  Goal:	resolution of infection  Assessment and plan of treatment:	continue antibiotic as prescribed  Follow-up with Dr. Toney Contreras infection disease in 1-2 weeks ---call for appointment   Follow-up with your PCP in 5 days for repeat blood work ---call for appointment  Secondary Diagnosis:	Leukocytosis  Goal:	resolving  Assessment and plan of treatment:	Follow-up with Dr. Toney Contreras infection disease in 1-2 weeks ---call for appointment  Secondary Diagnosis:	Transaminitis  Goal:	resolving  Assessment and plan of treatment:	Follow-up with your PCP in 5 days for repeat blood work ---call for appointment  Follow-up with Gastroenterology  Dr. Rodrigues for further monitoring  Secondary Diagnosis:	Bradycardia, sinus  Assessment and plan of treatment:	Follow-up with your primary care provider for out patient ECHO and for further monitoring ---call for appointment.

## 2019-01-09 NOTE — PROGRESS NOTE ADULT - SUBJECTIVE AND OBJECTIVE BOX
INTERVAL HPI/OVERNIGHT EVENTS:    HIDA scan result noted.  Reviewed history again.  Numerous TheraFlu (w/ acetaminophen , alcohol and xanax last 7-8 days.  Now improved symptoms  Mild RUQ / epigastric pain.  No fever or chills.  Myalgia, arthralgia has resolved.    MEDICATIONS  (STANDING):  ampicillin/sulbactam  IVPB 3 Gram(s) IV Intermittent every 6 hours  enoxaparin Injectable 40 milliGRAM(s) SubCutaneous daily  methylPREDNISolone sodium succinate Injectable 40 milliGRAM(s) IV Push daily  pantoprazole    Tablet 40 milliGRAM(s) Oral before breakfast  sodium chloride 0.9%. 1000 milliLiter(s) (100 mL/Hr) IV Continuous <Continuous>    MEDICATIONS  (PRN):      Allergies    No Known Allergies    Intolerances        Review of Systems:  No fever, chills, Nausea, emesis.  No diarrhea.  No chest pain or SOB.    Vital Signs Last 24 Hrs  T(C): 36.8 (2019 00:33), Max: 36.8 (2019 00:33)  T(F): 98.3 (2019 00:33), Max: 98.3 (2019 00:33)  HR: 53 (2019 05:10) (44 - 83)  BP: 96/56 (2019 00:33) (96/56 - 109/67)  BP(mean): --  RR: 17 (2019 05:10) (16 - 18)  SpO2: 98% (2019 05:10) (93% - 100%)    PHYSICAL EXAM:  Constitutional: NAD, well-developed  Neck: No LAD, supple  Respiratory: CTAB  Cardiovascular: S1 and S2, RRR,   Gastrointestinal: BS+, mild RUQ tenderness.  No rebound or guarding. No hepatomegaly.  No splenomegaly.  No ascites.  Extremities: No peripheral edema, neg clubing, cyanosis  Vascular: 2+ peripheral pulses  Neurological: A/O x 3, no focal deficits  Psychiatric: Normal mood, normal affect  Skin: No rashes    LABS:                        12.8   25.6  )-----------( 279      ( 2019 14:35 )             36.8         TPro  5.9<L>  /  Alb  3.2<L>  /  TBili  0.2  /  DBili  <0.1  /  AST  674<H>  /  ALT  743<H>  /  AlkPhos  144<H>        Urinalysis Basic - ( 2019 13:26 )    Color: Yellow / Appearance: Clear / S.031 / pH: x  Gluc: x / Ketone: Negative  / Bili: Negative / Urobili: 1.0 mg/dL   Blood: x / Protein: Trace mg/dL / Nitrite: Negative   Leuk Esterase: Negative / RBC: 1 /HPF / WBC 9 /HPF   Sq Epi: x / Non Sq Epi: 12 /HPF / Bacteria: Negative      LIVER FUNCTIONS - ( 2019 07:50 )  Alb: 3.2 g/dL / Pro: 5.9 g/dL / ALK PHOS: 144 U/L / ALT: 743 U/L / AST: 674 U/L / GGT: x             RADIOLOGY & ADDITIONAL TESTS:

## 2019-01-09 NOTE — PROGRESS NOTE ADULT - SUBJECTIVE AND OBJECTIVE BOX
Patient is a 23y old  Female who presents with a chief complaint of tonsilitis, leukocytosis (09 Jan 2019 10:55)      INTERVAL HISTORY: feels ok      PHYSICAL EXAM:  T(C): 37 (01-09-19 @ 22:20), Max: 37.2 (01-09-19 @ 07:17)  HR: 43 (01-09-19 @ 22:20) (43 - 54)  BP: 95/58 (01-09-19 @ 22:20) (92/60 - 96/63)  RR: 18 (01-09-19 @ 22:20) (16 - 18)  SpO2: 97% (01-09-19 @ 22:20) (93% - 98%)  Wt(kg): --  I&O's Summary    08 Jan 2019 07:01  -  09 Jan 2019 07:00  --------------------------------------------------------  IN: 1600 mL / OUT: 0 mL / NET: 1600 mL    09 Jan 2019 07:01  -  09 Jan 2019 22:35  --------------------------------------------------------  IN: 2140 mL / OUT: 0 mL / NET: 2140 mL      Height (cm): 167.64 (01-09 @ 07:17)  Weight (kg): 76.1 (01-09 @ 07:17)  BMI (kg/m2): 27.1 (01-09 @ 07:17)  BSA (m2): 1.86 (01-09 @ 07:17)    Appearance: Normal	  HEENT:    PERRL, EOMI	  Cardiovascular:  S1 S2, No JVD  Respiratory: Lungs clear to auscultation	  Psychiatry: Alert  Gastrointestinal:  Soft, Non-tender, + BS	  Skin: No rashes, No cyanosis  Extremities:  No edema of LE                                11.2   17.97 )-----------( 270      ( 09 Jan 2019 08:06 )             33.3     01-09    142  |  109<H>  |  13  ----------------------------<  90  3.7   |  22  |  0.76    Ca    8.2<L>      09 Jan 2019 07:08  Phos  3.2     01-09  Mg     2.1     01-09    TPro  5.4<L>  /  Alb  2.9<L>  /  TBili  0.3  /  DBili  <0.1  /  AST  194<H>  /  ALT  583<H>  /  AlkPhos  120  01-09        Labs personally reviewed      Assessment and Plan:   · Assessment		  patient is a 22 y/o female presenting with severe sore throat and myalgia found to have leukocytosis of 30K     Problem/Plan - 1:  ·  Problem: Pharyngitis due to Streptococcus species.  Plan: ID eval noted   ABx per ID  IV hydration.     Problem/Plan - 2:  ·  Problem: Chest wall pain.  Plan: tender on palpation , does not appear cardiac in nature  EKG with sinus pedro luis @40bpm, as she is athletic and has been doing regular intense aerobic exercise for years  Echo as outpt     Problem/Plan - 3:  ·  Problem: Prophylactic measure.  Plan: DVT and Gi PPX.           Nilesh Juarez DO [facility]  Cardiovascular Medicine  [phone]

## 2019-01-09 NOTE — PROGRESS NOTE ADULT - ATTENDING COMMENTS
I had a prolonged conversation with pt. and family re. likely diagnosis and plan who verbalizes clear understanding,   Spoke to all consultants.  95 minutes spent on total encounter; more than 50% of the visit was spent counseling and/or coordinating care by the attending physician.
pt seen and examined.  agree with note above.  HIDA negative for acute roberta  denies any pain.  unlikely acute roberta.  cont abx per med/ID  cont supportive care per med/GI  will sign off reconsult prn
I had a prolonged conversation with pt. and family re. likely diagnosis and plan who verbalizes clear understanding,   Spoke to all consultants.  95 minutes spent on total encounter; more than 50% of the visit was spent counseling and/or coordinating care by the attending physician.
I had a prolonged conversation with pt. and family re. likely diagnosis and plan who verbalizes clear understanding,   Spoke to all consultants.  More than 50% of the visit was spent counseling and/or coordinating care by the attending physician.    D/C planing for tomorrow after AM blood work for follow up down trending LFTS  FU with ID, GI and PMD after discharge

## 2019-01-09 NOTE — PROGRESS NOTE ADULT - SUBJECTIVE AND OBJECTIVE BOX
Subjective: Patient seen and examined. No new events except as noted.   doing well     REVIEW OF SYSTEMS:    CONSTITUTIONAL: No weakness, fevers or chills  EYES/ENT: No visual changes;  No vertigo or throat pain   NECK: No pain or stiffness  RESPIRATORY: No cough, wheezing, hemoptysis; No shortness of breath  CARDIOVASCULAR: No chest pain or palpitations  GASTROINTESTINAL: No abdominal or epigastric pain. No nausea, vomiting, or hematemesis; No diarrhea or constipation. No melena or hematochezia.  GENITOURINARY: No dysuria, frequency or hematuria  NEUROLOGICAL: No numbness or weakness  SKIN: No itching, burning, rashes, or lesions   All other review of systems is negative unless indicated above.    MEDICATIONS:  MEDICATIONS  (STANDING):  ampicillin/sulbactam  IVPB 3 Gram(s) IV Intermittent every 6 hours  enoxaparin Injectable 40 milliGRAM(s) SubCutaneous daily  methylPREDNISolone sodium succinate Injectable 40 milliGRAM(s) IV Push daily  pantoprazole    Tablet 40 milliGRAM(s) Oral before breakfast  sodium chloride 0.9%. 1000 milliLiter(s) (100 mL/Hr) IV Continuous <Continuous>      PHYSICAL EXAM:  T(C): 37.2 (19 @ 07:17), Max: 37.2 (19 @ 07:17)  HR: 54 (19 @ 07:17) (44 - 54)  BP: 96/63 (19 @ 07:17) (96/56 - 109/67)  RR: 18 (19 @ 07:17) (16 - 18)  SpO2: 98% (19 @ 07:17) (93% - 100%)  Wt(kg): --  I&O's Summary    2019 07:  -  2019 07:00  --------------------------------------------------------  IN: 1600 mL / OUT: 0 mL / NET: 1600 mL    2019 07:  -  2019 10:58  --------------------------------------------------------  IN: 320 mL / OUT: 0 mL / NET: 320 mL      Height (cm): 167.64 ( @ 07:17)  Weight (kg): 76.1 ( @ 07:17)  BMI (kg/m2): 27.1 ( @ 07:17)  BSA (m2): 1.86 ( @ 07:17)    Appearance: Normal	  HEENT:   Normal oral mucosa, PERRL, EOMI	  Lymphatic: No lymphadenopathy , no edema  Cardiovascular: Normal S1 S2, No JVD, No murmurs , Peripheral pulses palpable 2+ bilaterally  Respiratory: Lungs clear to auscultation, normal effort 	  Gastrointestinal:  mild RUQ tenderness on palpation   Skin: No rashes, No ecchymoses, No cyanosis, warm to touch  Musculoskeletal: Normal range of motion, normal strength  Psychiatry:  Mood & affect appropriate  Ext: No edema      All labs, Imaging and EKGs personally reviewed                           11.2   17.97 )-----------( 270      ( 2019 08:06 )             33.3                   142  |  109<H>  |  13  ----------------------------<  90  3.7   |  22  |  0.76    Ca    8.2<L>      2019 07:08  Phos  3.2       Mg     2.1         TPro  5.4<L>  /  Alb  2.9<L>  /  TBili  0.3  /  DBili  <0.1  /  AST  194<H>  /  ALT  583<H>  /  AlkPhos  120      PT/INR - ( 2019 08:06 )   PT: 12.7 sec;   INR: 1.11 ratio                            Urinalysis Basic - ( 2019 13:26 )    Color: Yellow / Appearance: Clear / S.031 / pH: x  Gluc: x / Ketone: Negative  / Bili: Negative / Urobili: 1.0 mg/dL   Blood: x / Protein: Trace mg/dL / Nitrite: Negative   Leuk Esterase: Negative / RBC: 1 /HPF / WBC 9 /HPF   Sq Epi: x / Non Sq Epi: 12 /HPF / Bacteria: Negative    < from: [facility] Hepatobiliary Imaging (19 @ 21:40) >    IMPRESSION: Normal hepatobiliary scan.    No evidence of acute cholecystitis.    < end of copied text >

## 2019-01-09 NOTE — PROGRESS NOTE ADULT - SUBJECTIVE AND OBJECTIVE BOX
GENERAL SURGERY PROGRESS NOTE      Subjective:  No acute events overnight. Underwent HIDA, which was negative for acute cholecystitis. This AM denies abdominal pain.       Objective:    PE:  General: No acute distress  Respiratory: Nonlabored  Cardiovascular: RRR  Abdominal: Soft, nondistended, nontender. No rebound or guarding.  Extremities: Warm    Vital Signs Last 24 Hrs  T(C): 37.2 (2019 07:17), Max: 37.2 (2019 07:17)  T(F): 99 (2019 07:17), Max: 99 (2019 07:17)  HR: 54 (2019 07:17) (44 - 83)  BP: 96/63 (2019 07:17) (96/56 - 109/67)  BP(mean): --  RR: 18 (2019 07:17) (16 - 18)  SpO2: 98% (2019 07:17) (93% - 100%)    I&O's Detail    2019 07:01  -  2019 07:00  --------------------------------------------------------  IN:    IV PiggyBack: 300 mL    Oral Fluid: 300 mL    sodium chloride 0.9%.: 1000 mL  Total IN: 1600 mL    OUT:  Total OUT: 0 mL    Total NET: 1600 mL      2019 07:01  -  2019 10:07  --------------------------------------------------------  IN:    Oral Fluid: 320 mL  Total IN: 320 mL    OUT:  Total OUT: 0 mL    Total NET: 320 mL          Daily Height in cm: 167.64 (2019 07:17)    Daily Weight in k.1 (2019 07:17)    MEDICATIONS  (STANDING):  ampicillin/sulbactam  IVPB 3 Gram(s) IV Intermittent every 6 hours  enoxaparin Injectable 40 milliGRAM(s) SubCutaneous daily  methylPREDNISolone sodium succinate Injectable 40 milliGRAM(s) IV Push daily  pantoprazole    Tablet 40 milliGRAM(s) Oral before breakfast  sodium chloride 0.9%. 1000 milliLiter(s) (100 mL/Hr) IV Continuous <Continuous>    MEDICATIONS  (PRN):      LABS:                        11.2   17.97 )-----------( 270      ( 2019 08:06 )             33.3     -    142  |  109<H>  |  13  ----------------------------<  90  3.7   |  22  |  0.76    Ca    8.2<L>      2019 07:08  Phos  3.2       Mg     2.1         TPro  5.4<L>  /  Alb  2.9<L>  /  TBili  0.3  /  DBili  <0.1  /  AST  194<H>  /  ALT  583<H>  /  AlkPhos  120      PT/INR - ( 2019 08:06 )   PT: 12.7 sec;   INR: 1.11 ratio           Urinalysis Basic - ( 2019 13:26 )    Color: Yellow / Appearance: Clear / S.031 / pH: x  Gluc: x / Ketone: Negative  / Bili: Negative / Urobili: 1.0 mg/dL   Blood: x / Protein: Trace mg/dL / Nitrite: Negative   Leuk Esterase: Negative / RBC: 1 /HPF / WBC 9 /HPF   Sq Epi: x / Non Sq Epi: 12 /HPF / Bacteria: Negative        RADIOLOGY & ADDITIONAL STUDIES:      NM Hepatobiliary Imaging (19 @ 21:40)  FINDINGS: There is prompt, homogeneous uptake of radiotracer by the   hepatocytes. Activity is first seen in the gallbladder at 45 minutes and   in the bowel at 15 minutes. There is good clearance of activity from the   liver by the end of the study.    IMPRESSION: Normal hepatobiliary scan.    No evidence of acute cholecystitis.

## 2019-01-09 NOTE — PROGRESS NOTE ADULT - PROBLEM SELECTOR PLAN 4
tender on palpation   EKG  card eval
tender on palpation   EKG  card eval
IV hydration  oral feeding

## 2019-01-09 NOTE — DISCHARGE NOTE ADULT - IF YOU ARE A SMOKER, IT IS IMPORTANT FOR YOUR HEALTH TO STOP SMOKING. PLEASE BE AWARE THAT SECOND HAND SMOKE IS ALSO HARMFUL.
Statement Selected
Osvaldo Murry  [address], [place] [address]  Phone: [phone]  Phone: (   )    -  Fax: (   )    -  Follow Up Time:

## 2019-01-09 NOTE — DISCHARGE NOTE ADULT - PLAN OF CARE
resolution of infection continue antibiotic as prescribed  Follow-up with Dr. Toney Contreras infection disease in 1-2 weeks ---call for appointment   Follow-up with your PCP in 5 days for repeat blood work ---call for appointment resolving Follow-up with Dr. Toney Contreras infection disease in 1-2 weeks ---call for appointment Follow-up with your PCP in 5 days for repeat blood work ---call for appointment  Follow-up with Gastroenterology  Dr. Rodrigues for further monitoring Follow-up with your primary care provider for out patient ECHO and for further monitoring ---call for appointment.

## 2019-01-09 NOTE — PROGRESS NOTE ADULT - PROBLEM SELECTOR PLAN 1
? acute roberta   worsening   RUQ ABd US: noted,   patient states that she drink 4-5 shots of hard alcohol daily   not dependant   no Hx of withdrawal   GI eval appreciated  F/U Hepatitis panel  HIDA scan   Surgery eval
trending down today  HIDA negative   RUQ ABd US: noted,   patient states that she drink 4-5 shots of hard alcohol daily   not dependant   no Hx of withdrawal   GI F/U appreciated  F/U Hepatitis panel  Surgery eval appreciated
ID eval noted   CT noted  IV unasyn   Solumedrol IV Q12   ID evaluation  Cx results  IV hydration

## 2019-01-09 NOTE — PROGRESS NOTE ADULT - SUBJECTIVE AND OBJECTIVE BOX
Patient is a 23y old  Female who presents with a chief complaint of tonsilitis, leukocytosis (09 Jan 2019 09:44)    Gradually feeling better.  less sore throat and able to swallow her breakfast.  No fevers or chills.  fatigue is improving.  No N/V or abd pain.      Medication:   ampicillin/sulbactam  IVPB 3 Gram(s) IV Intermittent every 6 hours  enoxaparin Injectable 40 milliGRAM(s) SubCutaneous daily  methylPREDNISolone sodium succinate Injectable 40 milliGRAM(s) IV Push daily  pantoprazole    Tablet 40 milliGRAM(s) Oral before breakfast  sodium chloride 0.9%. 1000 milliLiter(s) IV Continuous <Continuous>      Physical exam    T(C): 37.2 (01-09-19 @ 07:17), Max: 37.2 (01-09-19 @ 07:17)  HR: 54 (01-09-19 @ 07:17) (44 - 83)  BP: 96/63 (01-09-19 @ 07:17) (96/56 - 109/67)  RR: 18 (01-09-19 @ 07:17) (16 - 18)  SpO2: 98% (01-09-19 @ 07:17) (93% - 100%)  Wt(kg): --    alert NAD  EOMI anicteric sclera  Cv s1 S2 RRR  Lungs clear B/L  abd soft NT ND +BS  No LE edema or tenderness    Labs                        11.2   17.97 )-----------( 270      ( 09 Jan 2019 08:06 )             33.3       01-09    142  |  109<H>  |  13  ----------------------------<  90  3.7   |  22  |  0.76    Ca    8.2<L>      09 Jan 2019 07:08  Phos  3.2     01-09  Mg     2.1     01-09    TPro  5.4<L>  /  Alb  2.9<L>  /  TBili  0.3  /  DBili  <0.1  /  AST  194<H>  /  ALT  583<H>  /  AlkPhos  120  01-09      LIVER FUNCTIONS - ( 09 Jan 2019 07:08 )  Alb: 2.9 g/dL / Pro: 5.4 g/dL / ALK PHOS: 120 U/L / ALT: 583 U/L / AST: 194 U/L / GGT: x             [id number]

## 2019-01-09 NOTE — PROGRESS NOTE ADULT - ASSESSMENT
23F significant PMHx or PSHx who was admitted on 1/6 with a significant leukocytosis of 30 and no specific source. Being treated for group A strep pharyngitis, but unlikely to explain all of symptoms or lab results, including transaminitis. EBV also positive. Hepatitis panel negative. GB diffusely thickened on US, with f/u HIDA negative for cholecystitis    Plan:  - HIDA results noted, no evidence of acute cholecystitis  - Patient tentatively on add-on list for OR today for lap roberta, however pt to be re-assessed by Surgery attending this AM  - Care per primary team    SHMUEL Wood PGY2  Red Surgery  [id number] 23F significant PMHx or PSHx who was admitted on 1/6 with a significant leukocytosis of 30 and no specific source. Being treated for group A strep pharyngitis, but unlikely to explain all of symptoms or lab results, including transaminitis. EBV also positive. Hepatitis panel negative. GB diffusely thickened on US, with f/u HIDA negative for cholecystitis    Plan:  - HIDA results noted, no evidence of acute cholecystitis  - No plans for surgical intervention at this time  - Please call back with any questions or acute changes in clinical status    SHMUEL Wood PGY2  Red Surgery  [id number]

## 2019-01-09 NOTE — PROGRESS NOTE ADULT - PROBLEM SELECTOR PLAN 2
improved symptoms   ID eval noted   CT noted  IV unasyn, cont  Solumedrol IV Q12   ID evaluation  Cx results  IV hydration
improved symptoms   ID eval noted   CT noted  IV unasyn, cont  Solumedrol IV daily taper   ID evaluation  Cx results  IV hydration
monitor and trend  worsening this morning most likley secondary to steroid use   lactate level   IV hydration  Hematology eval appreciated

## 2019-01-09 NOTE — DISCHARGE NOTE ADULT - CARE PROVIDER_API CALL
Toney Contreras (MD), Infectious Disease; Internal Medicine  [address]  [place] [address]  Phone: [phone]  Fax: [phone]    Nilesh Juarez (DO), Cardiovascular Disease; Internal Medicine; Nuclear Cardiology  [address]  [place] [address]  Phone: [phone]  Fax: [phone]    Sesar Chu (DO), Medicine  [address]  [place] [address]  Phone: [phone]  Fax: [phone]    Vincent Rodrigues), Gastroenterology  [address]  [place] [address]  Phone: [phone]  Fax: [phone]

## 2019-01-09 NOTE — DISCHARGE NOTE ADULT - PATIENT PORTAL LINK FT
You can access the [facility], offered by [facility], by registering with the following website: [email or web address]

## 2019-01-09 NOTE — PROGRESS NOTE ADULT - PROBLEM SELECTOR PLAN 6
patient states that she drink 4-5 shots of hard alcohol daily   not dependant   no Hx of withdrawal   secondary to acute infection  monitor and trends   RUQ US  GI eval appreciated
monitor
monitor

## 2019-01-09 NOTE — PROGRESS NOTE ADULT - ASSESSMENT
leucocytosis and cervical adenopathy likely all a reactive process due to tonsillitis.  The leucocytosis is trending down with antibiotics.  She says that she may be going home today and suggested that she f/u in the office in a couple of weeks to assess for complete resolution.  Also mild anemia due to infection and will also assess for resolution of that in office.  Elevated liver enzymes trending down today from yesterday.  HIDA scan was negative for infection.

## 2019-01-10 VITALS
SYSTOLIC BLOOD PRESSURE: 91 MMHG | TEMPERATURE: 98 F | DIASTOLIC BLOOD PRESSURE: 50 MMHG | RESPIRATION RATE: 18 BRPM | HEART RATE: 41 BPM | OXYGEN SATURATION: 98 %

## 2019-01-10 LAB
ALBUMIN SERPL ELPH-MCNC: 3.1 G/DL — LOW (ref 3.3–5)
ALP SERPL-CCNC: 108 U/L — SIGNIFICANT CHANGE UP (ref 40–120)
ALT FLD-CCNC: 396 U/L — HIGH (ref 10–45)
ANION GAP SERPL CALC-SCNC: 9 MMOL/L — SIGNIFICANT CHANGE UP (ref 5–17)
AST SERPL-CCNC: 50 U/L — HIGH (ref 10–40)
BILIRUB SERPL-MCNC: 0.2 MG/DL — SIGNIFICANT CHANGE UP (ref 0.2–1.2)
BUN SERPL-MCNC: 11 MG/DL — SIGNIFICANT CHANGE UP (ref 7–23)
CALCIUM SERPL-MCNC: 8.4 MG/DL — SIGNIFICANT CHANGE UP (ref 8.4–10.5)
CHLORIDE SERPL-SCNC: 107 MMOL/L — SIGNIFICANT CHANGE UP (ref 96–108)
CO2 SERPL-SCNC: 22 MMOL/L — SIGNIFICANT CHANGE UP (ref 22–31)
CREAT SERPL-MCNC: 0.68 MG/DL — SIGNIFICANT CHANGE UP (ref 0.5–1.3)
GLUCOSE SERPL-MCNC: 107 MG/DL — HIGH (ref 70–99)
POTASSIUM SERPL-MCNC: 3.8 MMOL/L — SIGNIFICANT CHANGE UP (ref 3.5–5.3)
POTASSIUM SERPL-SCNC: 3.8 MMOL/L — SIGNIFICANT CHANGE UP (ref 3.5–5.3)
PROT SERPL-MCNC: 5.8 G/DL — LOW (ref 6–8.3)
SODIUM SERPL-SCNC: 138 MMOL/L — SIGNIFICANT CHANGE UP (ref 135–145)

## 2019-01-10 PROCEDURE — 84100 ASSAY OF PHOSPHORUS: CPT

## 2019-01-10 PROCEDURE — 96366 THER/PROPH/DIAG IV INF ADDON: CPT

## 2019-01-10 PROCEDURE — A9537: CPT

## 2019-01-10 PROCEDURE — 87633 RESP VIRUS 12-25 TARGETS: CPT

## 2019-01-10 PROCEDURE — 83036 HEMOGLOBIN GLYCOSYLATED A1C: CPT

## 2019-01-10 PROCEDURE — 93005 ELECTROCARDIOGRAM TRACING: CPT

## 2019-01-10 PROCEDURE — 84702 CHORIONIC GONADOTROPIN TEST: CPT

## 2019-01-10 PROCEDURE — 76705 ECHO EXAM OF ABDOMEN: CPT

## 2019-01-10 PROCEDURE — 87591 N.GONORRHOEAE DNA AMP PROB: CPT

## 2019-01-10 PROCEDURE — 86850 RBC ANTIBODY SCREEN: CPT

## 2019-01-10 PROCEDURE — 83540 ASSAY OF IRON: CPT

## 2019-01-10 PROCEDURE — 85027 COMPLETE CBC AUTOMATED: CPT

## 2019-01-10 PROCEDURE — 78226 HEPATOBILIARY SYSTEM IMAGING: CPT

## 2019-01-10 PROCEDURE — 85610 PROTHROMBIN TIME: CPT

## 2019-01-10 PROCEDURE — 86645 CMV ANTIBODY IGM: CPT

## 2019-01-10 PROCEDURE — 96365 THER/PROPH/DIAG IV INF INIT: CPT | Mod: XU

## 2019-01-10 PROCEDURE — 80076 HEPATIC FUNCTION PANEL: CPT

## 2019-01-10 PROCEDURE — 86665 EPSTEIN-BARR CAPSID VCA: CPT

## 2019-01-10 PROCEDURE — 86664 EPSTEIN-BARR NUCLEAR ANTIGEN: CPT

## 2019-01-10 PROCEDURE — 80053 COMPREHEN METABOLIC PANEL: CPT

## 2019-01-10 PROCEDURE — 96375 TX/PRO/DX INJ NEW DRUG ADDON: CPT

## 2019-01-10 PROCEDURE — 87486 CHLMYD PNEUM DNA AMP PROBE: CPT

## 2019-01-10 PROCEDURE — 86663 EPSTEIN-BARR ANTIBODY: CPT

## 2019-01-10 PROCEDURE — 80307 DRUG TEST PRSMV CHEM ANLYZR: CPT

## 2019-01-10 PROCEDURE — 86901 BLOOD TYPING SEROLOGIC RH(D): CPT

## 2019-01-10 PROCEDURE — 86900 BLOOD TYPING SEROLOGIC ABO: CPT

## 2019-01-10 PROCEDURE — 87491 CHLMYD TRACH DNA AMP PROBE: CPT

## 2019-01-10 PROCEDURE — 83735 ASSAY OF MAGNESIUM: CPT

## 2019-01-10 PROCEDURE — 87040 BLOOD CULTURE FOR BACTERIA: CPT

## 2019-01-10 PROCEDURE — 82728 ASSAY OF FERRITIN: CPT

## 2019-01-10 PROCEDURE — 99285 EMERGENCY DEPT VISIT HI MDM: CPT | Mod: 25

## 2019-01-10 PROCEDURE — 87798 DETECT AGENT NOS DNA AMP: CPT

## 2019-01-10 PROCEDURE — 81025 URINE PREGNANCY TEST: CPT

## 2019-01-10 PROCEDURE — 81001 URINALYSIS AUTO W/SCOPE: CPT

## 2019-01-10 PROCEDURE — 70491 CT SOFT TISSUE NECK W/DYE: CPT

## 2019-01-10 PROCEDURE — 71046 X-RAY EXAM CHEST 2 VIEWS: CPT

## 2019-01-10 PROCEDURE — 86644 CMV ANTIBODY: CPT

## 2019-01-10 PROCEDURE — 87581 M.PNEUMON DNA AMP PROBE: CPT

## 2019-01-10 PROCEDURE — 80074 ACUTE HEPATITIS PANEL: CPT

## 2019-01-10 PROCEDURE — 82248 BILIRUBIN DIRECT: CPT

## 2019-01-10 PROCEDURE — 87880 STREP A ASSAY W/OPTIC: CPT

## 2019-01-10 PROCEDURE — 96367 TX/PROPH/DG ADDL SEQ IV INF: CPT

## 2019-01-10 PROCEDURE — 83550 IRON BINDING TEST: CPT

## 2019-01-10 RX ORDER — PANTOPRAZOLE SODIUM 20 MG/1
1 TABLET, DELAYED RELEASE ORAL
Qty: 30 | Refills: 0 | OUTPATIENT
Start: 2019-01-10 | End: 2019-02-08

## 2019-01-10 RX ADMIN — AMPICILLIN SODIUM AND SULBACTAM SODIUM 200 GRAM(S): 250; 125 INJECTION, POWDER, FOR SUSPENSION INTRAMUSCULAR; INTRAVENOUS at 11:14

## 2019-01-10 RX ADMIN — AMPICILLIN SODIUM AND SULBACTAM SODIUM 200 GRAM(S): 250; 125 INJECTION, POWDER, FOR SUSPENSION INTRAMUSCULAR; INTRAVENOUS at 05:07

## 2019-01-10 RX ADMIN — PANTOPRAZOLE SODIUM 40 MILLIGRAM(S): 20 TABLET, DELAYED RELEASE ORAL at 05:07

## 2019-01-10 RX ADMIN — SODIUM CHLORIDE 100 MILLILITER(S): 9 INJECTION INTRAMUSCULAR; INTRAVENOUS; SUBCUTANEOUS at 11:16

## 2019-01-10 RX ADMIN — Medication 40 MILLIGRAM(S): at 05:05

## 2019-01-10 NOTE — PROGRESS NOTE ADULT - PROVIDER SPECIALTY LIST ADULT
Cardiology
Gastroenterology
Gastroenterology
Heme/Onc
Heme/Onc
Infectious Disease
Infectious Disease
Internal Medicine
Surgery
Heme/Onc

## 2019-01-10 NOTE — PROGRESS NOTE ADULT - SUBJECTIVE AND OBJECTIVE BOX
Patient is a 23y old  Female who presents with a chief complaint of tonsilitis, leukocytosis (09 Jan 2019 17:34)    Has been gradually feeling better    Medication:   ampicillin/sulbactam  IVPB 3 Gram(s) IV Intermittent every 6 hours  enoxaparin Injectable 40 milliGRAM(s) SubCutaneous daily  methylPREDNISolone sodium succinate Injectable 40 milliGRAM(s) IV Push daily  pantoprazole    Tablet 40 milliGRAM(s) Oral before breakfast  sodium chloride 0.9%. 1000 milliLiter(s) IV Continuous <Continuous>      Physical exam    T(C): 37 (01-09-19 @ 22:20), Max: 37 (01-09-19 @ 22:20)  HR: 43 (01-09-19 @ 22:20) (43 - 51)  BP: 95/58 (01-09-19 @ 22:20) (92/60 - 95/58)  RR: 18 (01-09-19 @ 22:20) (18 - 18)  SpO2: 97% (01-09-19 @ 22:20) (97% - 97%)  Wt(kg): --    restingNAD  Resp non labored    Labs                        11.2   17.97 )-----------( 270      ( 09 Jan 2019 08:06 )             33.3       01-10    138  |  107  |  11  ----------------------------<  107<H>  3.8   |  22  |  0.68    Ca    8.4      10 Issac 2019 06:25  Phos  3.2     01-09  Mg     2.1     01-09    TPro  5.8<L>  /  Alb  3.1<L>  /  TBili  0.2  /  DBili  x   /  AST  50<H>  /  ALT  396<H>  /  AlkPhos  108  01-10      LIVER FUNCTIONS - ( 10 Issac 2019 06:25 )  Alb: 3.1 g/dL / Pro: 5.8 g/dL / ALK PHOS: 108 U/L / ALT: 396 U/L / AST: 50 U/L / GGT: x             [id number]

## 2019-01-10 NOTE — PROGRESS NOTE ADULT - REASON FOR ADMISSION
tonsilitis, leukocytosis

## 2019-01-10 NOTE — PROGRESS NOTE ADULT - ASSESSMENT
URI - on antibiotics.  Significant leucocytosis has been trending down.  Also with mild anemia favored due to infection.  CBC pending from today.      Elevated liver enzymes also trending down today

## 2019-01-11 LAB
CULTURE RESULTS: SIGNIFICANT CHANGE UP
CULTURE RESULTS: SIGNIFICANT CHANGE UP
SPECIMEN SOURCE: SIGNIFICANT CHANGE UP
SPECIMEN SOURCE: SIGNIFICANT CHANGE UP

## 2019-01-30 ENCOUNTER — OUTPATIENT (OUTPATIENT)
Dept: OUTPATIENT SERVICES | Facility: HOSPITAL | Age: 24
LOS: 1 days | End: 2019-01-30

## 2019-01-30 ENCOUNTER — APPOINTMENT (OUTPATIENT)
Dept: ULTRASOUND IMAGING | Facility: CLINIC | Age: 24
End: 2019-01-30

## 2019-01-30 DIAGNOSIS — Z00.8 ENCOUNTER FOR OTHER GENERAL EXAMINATION: ICD-10-CM

## 2019-06-13 ENCOUNTER — RESULT REVIEW (OUTPATIENT)
Age: 24
End: 2019-06-13

## 2022-07-16 ENCOUNTER — NON-APPOINTMENT (OUTPATIENT)
Age: 27
End: 2022-07-16

## 2023-07-30 NOTE — ED PROVIDER NOTE - PRINCIPAL DIAGNOSIS
Well appearing, awake, alert, oriented to person, place, time/situation and in no apparent distress. normal... Pharyngitis due to Streptococcus species